# Patient Record
Sex: MALE | Race: WHITE | Employment: FULL TIME | URBAN - METROPOLITAN AREA
[De-identification: names, ages, dates, MRNs, and addresses within clinical notes are randomized per-mention and may not be internally consistent; named-entity substitution may affect disease eponyms.]

---

## 2017-02-07 ENCOUNTER — TELEPHONE (OUTPATIENT)
Dept: FAMILY MEDICINE | Facility: CLINIC | Age: 52
End: 2017-02-07

## 2017-02-07 DIAGNOSIS — I10 HYPERTENSION GOAL BP (BLOOD PRESSURE) < 140/90: Primary | ICD-10-CM

## 2017-02-07 RX ORDER — LISINOPRIL/HYDROCHLOROTHIAZIDE 10-12.5 MG
1 TABLET ORAL EVERY MORNING
Qty: 30 TABLET | Refills: 0 | Status: SHIPPED | OUTPATIENT
Start: 2017-02-07 | End: 2017-03-22

## 2017-02-07 NOTE — TELEPHONE ENCOUNTER
Pt needs this medication refilled. Please call his wife with any problems. She is at [phone].      lisinopril-hydrochlorothiazide (PRINZIDE,ZESTORETIC) 10-12.5 MG per tablet        Last Written Prescription Date: 11-12-16  Last Fill Quantity: 90, # refills: 3  Last Office Visit with [facility], [facility] or [facility] prescribing provider: 04-29-16       POTASSIUM   Date Value Ref Range Status   11/12/2015 4.1 3.4 - 5.3 mmol/L Final     CREATININE   Date Value Ref Range Status   11/12/2015 1.02 0.66 - 1.25 mg/dL Final     BP Readings from Last 3 Encounters:   04/29/16 123/83   11/30/15 131/85   11/12/15 132/80     [facility] [place]

## 2017-02-07 NOTE — TELEPHONE ENCOUNTER
Medication is being filled for 1 time refill only due to:  Patient needs labs bmp. Patient needs to be seen because it has been more than one year since last visit. Blossom PHOENIX RN

## 2017-03-22 ENCOUNTER — OFFICE VISIT (OUTPATIENT)
Dept: FAMILY MEDICINE | Facility: CLINIC | Age: 52
End: 2017-03-22
Payer: COMMERCIAL

## 2017-03-22 VITALS
DIASTOLIC BLOOD PRESSURE: 80 MMHG | SYSTOLIC BLOOD PRESSURE: 124 MMHG | HEIGHT: 72 IN | TEMPERATURE: 98.1 F | BODY MASS INDEX: 34.05 KG/M2 | WEIGHT: 251.4 LBS | HEART RATE: 78 BPM

## 2017-03-22 DIAGNOSIS — E78.5 HYPERLIPIDEMIA LDL GOAL <130: ICD-10-CM

## 2017-03-22 DIAGNOSIS — I10 HYPERTENSION GOAL BP (BLOOD PRESSURE) < 140/90: Primary | ICD-10-CM

## 2017-03-22 DIAGNOSIS — Z11.59 NEED FOR HEPATITIS C SCREENING TEST: ICD-10-CM

## 2017-03-22 LAB
ANION GAP SERPL CALCULATED.3IONS-SCNC: 3 MMOL/L (ref 3–14)
BUN SERPL-MCNC: 15 MG/DL (ref 7–30)
CALCIUM SERPL-MCNC: 8.5 MG/DL (ref 8.5–10.1)
CHLORIDE SERPL-SCNC: 104 MMOL/L (ref 94–109)
CHOLEST SERPL-MCNC: 201 MG/DL
CO2 SERPL-SCNC: 33 MMOL/L (ref 20–32)
CREAT SERPL-MCNC: 0.99 MG/DL (ref 0.66–1.25)
GFR SERPL CREATININE-BSD FRML MDRD: 79 ML/MIN/1.7M2
GLUCOSE SERPL-MCNC: 103 MG/DL (ref 70–99)
HDLC SERPL-MCNC: 37 MG/DL
LDLC SERPL CALC-MCNC: 145 MG/DL
NONHDLC SERPL-MCNC: 164 MG/DL
POTASSIUM SERPL-SCNC: 3.9 MMOL/L (ref 3.4–5.3)
SODIUM SERPL-SCNC: 140 MMOL/L (ref 133–144)
TRIGL SERPL-MCNC: 96 MG/DL

## 2017-03-22 PROCEDURE — 86803 HEPATITIS C AB TEST: CPT | Performed by: FAMILY MEDICINE

## 2017-03-22 PROCEDURE — 80061 LIPID PANEL: CPT | Performed by: FAMILY MEDICINE

## 2017-03-22 PROCEDURE — 99214 OFFICE O/P EST MOD 30 MIN: CPT | Performed by: FAMILY MEDICINE

## 2017-03-22 PROCEDURE — 80048 BASIC METABOLIC PNL TOTAL CA: CPT | Performed by: FAMILY MEDICINE

## 2017-03-22 PROCEDURE — 36415 COLL VENOUS BLD VENIPUNCTURE: CPT | Performed by: FAMILY MEDICINE

## 2017-03-22 RX ORDER — LISINOPRIL/HYDROCHLOROTHIAZIDE 10-12.5 MG
1 TABLET ORAL EVERY MORNING
Qty: 90 TABLET | Refills: 3 | Status: SHIPPED | OUTPATIENT
Start: 2017-03-22 | End: 2018-04-30

## 2017-03-22 ASSESSMENT — PAIN SCALES - GENERAL: PAINLEVEL: NO PAIN (0)

## 2017-03-22 NOTE — MR AVS SNAPSHOT
After Visit Summary   3/22/2017    Juma Spivey    MRN: [id number]           Patient Information     Date Of Birth          1965        Visit Information        Provider Department      3/22/2017 7:20 AM Adri Harry MD [facility]        Today's Diagnoses     Need for hepatitis C screening test    -  1    Hypertension goal BP (blood pressure) < 140/90        Hyperlipidemia LDL goal <130          Care Instructions          Thank you for choosing [facility] [place] [facility].  You may be receiving a survey in the mail from Sandor Underwood regarding your visit today.  Please take a few minutes to complete and return the survey to let us know how we are doing.      Our Clinic hours are:  Mondays    7:20 am - 7 pm  Tues -  Fri  7:20 am - 5 pm    Clinic Phone: [phone]    The clinic lab opens at 7:30 am Mon - Fri and appointments are required.    [facility]  Ph. [phone]  Monday-Thursday 8 am - 7pm  Tues/Wed/Fri 8 am - 5:30 pm               Follow-ups after your visit        Who to contact     If you have questions or need follow up information about today's clinic visit or your schedule please contact [facility] directly at [phone].  Normal or non-critical lab and imaging results will be communicated to you by MyChart, letter or phone within 4 business days after the clinic has received the results. If you do not hear from us within 7 days, please contact the clinic through [employer] or phone. If you have a critical or abnormal lab result, we will notify you by phone as soon as possible.  Submit refill requests through [employer] or call your pharmacy and they will forward the refill request to us. Please allow 3 business days for your refill to be completed.          Additional Information About Your Visit        [employer]-art ([employer]) Information     [employer] gives you secure access to your electronic health record. If you see a primary care  "provider, you can also send messages to your care team and make appointments. If you have questions, please call your primary care clinic.  If you do not have a primary care provider, please call [phone] and they will assist you.        Care EveryWhere ID     This is your Care EveryWhere ID. This could be used by other organizations to access your [place] medical records  [id number]        Your Vitals Were     Pulse Temperature Height BMI (Body Mass Index)          78 98.1  F (36.7  C) (Tympanic) 5' 11.75\" (1.822 m) 34.33 kg/m2         Blood Pressure from Last 3 Encounters:   03/22/17 124/80   04/29/16 123/83   11/30/15 131/85    Weight from Last 3 Encounters:   03/22/17 251 lb 6.4 oz (114 kg)   04/29/16 245 lb (111.1 kg)   11/30/15 253 lb (114.8 kg)              We Performed the Following     Basic metabolic panel     Hepatitis C antibody     Lipid Profile (Chol, Trig, HDL, LDL calc)          Today's Medication Changes          These changes are accurate as of: 3/22/17  8:28 AM.  If you have any questions, ask your nurse or doctor.               These medicines have changed or have updated prescriptions.        Dose/Directions    lisinopril-hydrochlorothiazide 10-12.5 MG per tablet   Commonly known as:  PRINZIDE/ZESTORETIC   This may have changed:  additional instructions   Used for:  Hypertension goal BP (blood pressure) < 140/90   Changed by:  Adri Harry MD        Dose:  1 tablet   Take 1 tablet by mouth every morning   Quantity:  90 tablet   Refills:  3            Where to get your medicines      These medications were sent to [facility]/pharmacy #[id number] - [place] [id number] [place] [address]  [address] [address], [facility] [address]     Phone:  [phone]     lisinopril-hydrochlorothiazide 10-12.5 MG per tablet                Primary Care Provider Office Phone # Fax #    Bryan Harry [phone]       [facility] [address] [facility] [address]   "      Thank you!     Thank you for choosing [facility]  for your care. Our goal is always to provide you with excellent care. Hearing back from our patients is one way we can continue to improve our services. Please take a few minutes to complete the written survey that you may receive in the mail after your visit with us. Thank you!             Your Updated Medication List - Protect others around you: Learn how to safely use, store and throw away your medicines at www.disposemymeds.org.          This list is accurate as of: 3/22/17  8:28 AM.  Always use your most recent med list.                   Brand Name Dispense Instructions for use    fluticasone 50 MCG/ACT spray    FLONASE    1 g    Spray 2 sprays into both nostrils daily Schedule appt for further refills.       lisinopril-hydrochlorothiazide 10-12.5 MG per tablet    PRINZIDE/ZESTORETIC    90 tablet    Take 1 tablet by mouth every morning       order for DME     1 pair    Compression stockings

## 2017-03-22 NOTE — PROGRESS NOTES
SUBJECTIVE:                                                    Juma Spivey is a 51 year old male who presents to clinic today for the following health issues:      Hypertension Follow-up      Outpatient blood pressures are being checked at home.  Results are 127/80.    Low Salt Diet: no added salt       Amount of exercise or physical activity: 2-3 days/week for an average of 30-45 minutes    Problems taking medications regularly: No    Medication side effects: none    Diet: low salt    ADDITIONAL HPI: 51 year old male here for above issue.  Also has a history of hyperlipidemia, not on medication. Will plan to re-check labs. Denies ay chest pain, shortness of breath, palpitations.    ROS: 10 point review of systems negative except as per HPI.    PAST MEDICAL HISTORY:  Past Medical History:   Diagnosis Date     Other lymphedema         ACTIVE MEDICAL PROBLEMS:  Patient Active Problem List   Diagnosis     Lymphedema     Hypertension goal BP (blood pressure) < 140/90     Hyperlipidemia LDL goal <130     Overweight     Microscopic hematuria     Colon polyp        FAMILY HISTORY:  Family History   Problem Relation Age of Onset     Hypertension Mother      Neurologic Disorder Mother      brain bleeds     Alcohol/Drug Father      Breast Cancer Sister        SOCIAL HISTORY:  Social History     Social History     Marital status:      Spouse name: N/A     Number of children: N/A     Years of education: N/A     Occupational History     Not on file.     Social History Main Topics     Smoking status: Never Smoker     Smokeless tobacco: Never Used     Alcohol use Yes      Comment: occ.     Drug use: No     Sexual activity: Yes     Partners: Female     Other Topics Concern     Parent/Sibling W/ Cabg, Mi Or Angioplasty Before 65f 55m? No     Social History Narrative       MEDICATIONS:  Current Outpatient Prescriptions   Medication Sig Dispense Refill     lisinopril-hydrochlorothiazide (PRINZIDE/ZESTORETIC) 10-12.5 MG  "per tablet Take 1 tablet by mouth every morning 90 tablet 3     [DISCONTINUED] lisinopril-hydrochlorothiazide (PRINZIDE/ZESTORETIC) 10-12.5 MG per tablet Take 1 tablet by mouth every morning Needs labs and office visit for further refills 30 tablet 0     fluticasone (FLONASE) 50 MCG/ACT nasal spray Spray 2 sprays into both nostrils daily Schedule appt for further refills. (Patient not taking: Reported on 3/22/2017) 1 g 1     ORDER FOR DME Compression stockings 1 pair 0       ALLERGIES:     Allergies   Allergen Reactions     Penicillins      Thinks he may be allergic because his father was       Problem list, Medication list, Allergies, and Medical/Social/Surgical histories reviewed in [employer] and updated as appropriate.    OBJECTIVE:                                                    VITALS: /80 (BP Location: Right arm, Patient Position: Chair, Cuff Size: Adult Large)  Pulse 78  Temp 98.1  F (36.7  C) (Tympanic)  Ht 5' 11.75\" (1.822 m)  Wt 251 lb 6.4 oz (114 kg)  BMI 34.33 kg/m2 Body mass index is 34.33 kg/(m^2).  GENERAL: Pleasant, well appearing male.  HEENT: PERRL, EOMI, oropharynx clear.  NECK: supple, no thyromegaly or thyroid masses, no lymphadenopathy.  CV: RRR, no murmurs, rubs or gallops. No carotid bruits.   LUNGS: Clear to auscultation bilaterally, normal effort.  ABDOMEN: Soft, non-distended, non-tender.  No hepatosplenomegaly or palpable masses.    SKIN: warm and dry without obvious rashes.   EXTREMITIES: No edema, normal pulses.     ASSESSMENT/PLAN:                                                    1. Hypertension goal BP (blood pressure) < 140/90  Stable. Refilled medication and checked labs.    - lisinopril-hydrochlorothiazide (PRINZIDE/ZESTORETIC) 10-12.5 MG per tablet; Take 1 tablet by mouth every morning  Dispense: 90 tablet; Refill: 3  - Basic metabolic panel    2. Hyperlipidemia LDL goal <130  Re-check labs.  - Lipid Profile (Chol, Trig, HDL, LDL calc)    3. Need for hepatitis C " screening test  - Hepatitis C antibody

## 2017-03-22 NOTE — PATIENT INSTRUCTIONS
Thank you for choosing [facility].  You may be receiving a survey in the mail from [facility] regarding your visit today.  Please take a few minutes to complete and return the survey to let us know how we are doing.      Our Clinic hours are:  Mondays    7:20 am - 7 pm  Tues -  Fri  7:20 am - 5 pm    Clinic Phone: [phone]    The clinic lab opens at 7:30 am Mon - Fri and appointments are required.    [facility]. [phone]  Monday-Thursday 8 am - 7pm  Tues/Wed/Fri 8 am - 5:30 pm

## 2017-03-22 NOTE — NURSING NOTE
"Chief Complaint   Patient presents with     Hypertension     med recheck and refill       Initial /80 (BP Location: Right arm, Patient Position: Chair, Cuff Size: Adult Large)  Pulse 78  Temp 98.1  F (36.7  C) (Tympanic)  Ht 5' 11.75\" (1.822 m)  Wt 251 lb 6.4 oz (114 kg)  BMI 34.33 kg/m2 Estimated body mass index is 34.33 kg/(m^2) as calculated from the following:    Height as of this encounter: 5' 11.75\" (1.822 m).    Weight as of this encounter: 251 lb 6.4 oz (114 kg).  Medication Reconciliation: complete    "

## 2017-03-23 LAB — HCV AB SERPL QL IA: NORMAL

## 2017-11-06 ENCOUNTER — MEDICAL CORRESPONDENCE (OUTPATIENT)
Dept: HEALTH INFORMATION MANAGEMENT | Facility: CLINIC | Age: 52
End: 2017-11-06

## 2017-12-28 ENCOUNTER — RADIANT APPOINTMENT (OUTPATIENT)
Dept: GENERAL RADIOLOGY | Facility: CLINIC | Age: 52
End: 2017-12-28
Attending: FAMILY MEDICINE
Payer: COMMERCIAL

## 2017-12-28 ENCOUNTER — OFFICE VISIT (OUTPATIENT)
Dept: FAMILY MEDICINE | Facility: CLINIC | Age: 52
End: 2017-12-28
Payer: COMMERCIAL

## 2017-12-28 VITALS
BODY MASS INDEX: 35.05 KG/M2 | SYSTOLIC BLOOD PRESSURE: 155 MMHG | DIASTOLIC BLOOD PRESSURE: 86 MMHG | WEIGHT: 258.8 LBS | TEMPERATURE: 97.9 F | HEIGHT: 72 IN | HEART RATE: 83 BPM

## 2017-12-28 DIAGNOSIS — M25.562 ACUTE PAIN OF LEFT KNEE: Primary | ICD-10-CM

## 2017-12-28 PROCEDURE — 73560 X-RAY EXAM OF KNEE 1 OR 2: CPT | Mod: LT

## 2017-12-28 PROCEDURE — 99214 OFFICE O/P EST MOD 30 MIN: CPT | Performed by: FAMILY MEDICINE

## 2017-12-28 RX ORDER — PREDNISONE 20 MG/1
40 TABLET ORAL DAILY
Qty: 10 TABLET | Refills: 0 | Status: SHIPPED | OUTPATIENT
Start: 2017-12-28 | End: 2018-01-02

## 2017-12-28 ASSESSMENT — ANXIETY QUESTIONNAIRES
1. FEELING NERVOUS, ANXIOUS, OR ON EDGE: SEVERAL DAYS
7. FEELING AFRAID AS IF SOMETHING AWFUL MIGHT HAPPEN: NOT AT ALL
6. BECOMING EASILY ANNOYED OR IRRITABLE: NOT AT ALL
1. FEELING NERVOUS, ANXIOUS, OR ON EDGE: NOT AT ALL
2. NOT BEING ABLE TO STOP OR CONTROL WORRYING: NOT AT ALL
IF YOU CHECKED OFF ANY PROBLEMS ON THIS QUESTIONNAIRE, HOW DIFFICULT HAVE THESE PROBLEMS MADE IT FOR YOU TO DO YOUR WORK, TAKE CARE OF THINGS AT HOME, OR GET ALONG WITH OTHER PEOPLE: NOT DIFFICULT AT ALL
GAD7 TOTAL SCORE: 2
3. WORRYING TOO MUCH ABOUT DIFFERENT THINGS: SEVERAL DAYS
7. FEELING AFRAID AS IF SOMETHING AWFUL MIGHT HAPPEN: NOT AT ALL
GAD7 TOTAL SCORE: 0
3. WORRYING TOO MUCH ABOUT DIFFERENT THINGS: NOT AT ALL
2. NOT BEING ABLE TO STOP OR CONTROL WORRYING: NOT AT ALL
6. BECOMING EASILY ANNOYED OR IRRITABLE: NOT AT ALL
5. BEING SO RESTLESS THAT IT IS HARD TO SIT STILL: NOT AT ALL
5. BEING SO RESTLESS THAT IT IS HARD TO SIT STILL: NOT AT ALL

## 2017-12-28 ASSESSMENT — PATIENT HEALTH QUESTIONNAIRE - PHQ9
5. POOR APPETITE OR OVEREATING: NOT AT ALL
5. POOR APPETITE OR OVEREATING: NOT AT ALL
SUM OF ALL RESPONSES TO PHQ QUESTIONS 1-9: 10

## 2017-12-28 NOTE — PROGRESS NOTES
SUBJECTIVE:   Juma Spivey is a 52 year old male who presents to clinic today for the following health issues:    Joint Pain    Onset: Around thanksgiving    Description:   Location: left knee - below kneecap and anterior  Character: Sharp, Dull ache, Stabbing and Burning    Intensity: moderate to severe, 4/10 at visit today    Progression of Symptoms: same    Accompanying Signs & Symptoms:  Other symptoms: radiation of pain up to thigh and down into calf    History:   Previous similar pain: YES- years ago he was told he had bursitis and at that time it was in both legs.      Precipitating factors:   Trauma or overuse: no     Alleviating factors:  Improved by: rest/inactivity, ice and Advil     Therapies Tried and outcome:  rest/inactivity, ice and Advil seemed to dull the pain.   Tried not to pivot on left knee as much as he can - not much relief.    Verified above history with patient.    Patient denies change in activity, new hobbies or trauma prior to onset.  Patient is a .    Problem list and histories reviewed & adjusted, as indicated.  Additional history: as documented    Patient Active Problem List   Diagnosis     Lymphedema     Hypertension goal BP (blood pressure) < 140/90     Hyperlipidemia LDL goal <130     Overweight     Microscopic hematuria     Colon polyp     History reviewed. No pertinent surgical history.    Social History   Substance Use Topics     Smoking status: Never Smoker     Smokeless tobacco: Never Used     Alcohol use Yes      Comment: occ.     Family History   Problem Relation Age of Onset     Hypertension Mother      Neurologic Disorder Mother      brain bleeds     Alcohol/Drug Father      Breast Cancer Sister          Current Outpatient Prescriptions   Medication Sig Dispense Refill     predniSONE (DELTASONE) 20 MG tablet Take 2 tablets (40 mg) by mouth daily for 5 days 10 tablet 0     lisinopril-hydrochlorothiazide (PRINZIDE/ZESTORETIC) 10-12.5 MG per tablet Take 1  "tablet by mouth every morning 90 tablet 3     ORDER FOR DME Compression stockings 1 pair 0     fluticasone (FLONASE) 50 MCG/ACT nasal spray Spray 2 sprays into both nostrils daily Schedule appt for further refills. (Patient not taking: Reported on 12/28/2017) 1 g 1     Allergies   Allergen Reactions     Penicillins      Thinks he may be allergic because his father was         Reviewed and updated as needed this visit by clinical staff  Tobacco  Allergies  Meds  Problems  Med Hx  Surg Hx  Fam Hx  Soc Hx        Reviewed and updated as needed this visit by Provider  Allergies  Meds  Problems         ROS:  C: NEGATIVE for fever, chills, change in weight  I: NEGATIVE for worrisome rashes, moles or lesions  MUSCULOSKELETAL:see above  N: NEGATIVE for weakness, dizziness or paresthesias  H: NEGATIVE for bleeding problems    OBJECTIVE:                                                    /86 (BP Location: Right arm, Patient Position: Sitting, Cuff Size: Adult Large)  Pulse 83  Temp 97.9  F (36.6  C) (Tympanic)  Ht 5' 11.75\" (1.822 m)  Wt 258 lb 12.8 oz (117.4 kg)  BMI 35.34 kg/m2  Body mass index is 35.34 kg/(m^2).  GENERAL: obese, alert and no distress  Knee Exam, LEFT:  Inspection: AP/lateral alignment normal, No effusion, No quad atrophy  Tender: medial joint line, medial tibial plateau  Non-tender: rest of knee  Active Range of Motion: full flexion, pain with flexion, full extension, no pain with extension, no patellofemoral crepitus with extension  Strength: 5/5 all groups  Special tests: positive Valgus stress test, normal Varus, negative Lachman's test, negative pivot shift, positive hyperflexion external rotation test, no apprehension with lateral stress of the patella    Also examined: hip full range of motion     Diagnostic test results:  Diagnostic Test Results:  none        ASSESSMENT/PLAN:                                                      ICD-10-CM    1. Acute pain of left knee M25.562 " predniSONE (DELTASONE) 20 MG tablet     ORTHO  REFERRAL     XR Knee Standing AP Bilat & Lateral Left     No acute joint today.  No significant pain or LROM that would warrant immediate MRI eval, but should r/o DJD.  Possible strain or partial meniscus or ligament tear.   Discussed trial of prednisone oral. Patient concurred.    Acetaminophen 325 mg orally 1-2 tabs every 4-6 hrs as needed for pain    Ice compress Q6-8 hrs   Avoid repetitive motion.  Advised to see provider if with severe pain, LROM, fever or joint redness.     Ortho consult discussed and patient concurred.  Advised possible physical therapy may be next step if not completely resolved.    Follow up with Provider - at ortho appointment   Patient Instructions   Xray result to be called to you in 24 hours.  You will be contacted in 1-2 business days to get a schedule for the non-surgical orthopedic specialist.    Take prednisone as prescribed for next 5 days.        Knee Pain of Uncertain Cause    There are several common causes for knee pain. These can include:    A sprain of the ligaments that support the joint    An injury to the cartilage lining of the joint    Arthritis from wear-and-tear or inflammation  There are other causes as well. There may also be swelling, reduced movement of the knee joint, and pain with walking. A definite diagnosis will still need to be made. If your symptoms do not improve, further follow-up and testing may be needed.  Home care    Stay off the injured leg as much as possible until pain improves.    Apply an ice pack over the injured area for 15 to 20 minutes every 3 to 6 hours. You should do this for the first 24 to 48 hours. You can make an ice pack by filling a plastic bag that seals at the top with ice cubes and then wrapping it with a thin towel. Continue to use ice packs for relief of pain and swelling as needed. As the ice melts, be careful to avoid getting your wrap, splint, or cast wet. After 48 hours,  apply heat (warm shower or warm bath) for 15 to 20 minutes several times a day, or alternate ice and heat. If you have to wear a hook-and-loop knee brace, you can open it to apply the ice pack, or heat, directly to the knee. Never put ice directly on the skin. Always wrap the ice in a towel or other type of cloth.    You may use over-the-counter pain medicine to control pain, unless another pain medicine was prescribed. If you have chronic liver or kidney disease or ever had a stomach ulcer or GI bleeding, talk with your healthcare provider using these medicines.    If crutches or a walker have been recommended, do not put weight on the injured leg until you can do so without pain. Check with your healthcare provider before returning to sports or full work duties.    If you have a hook-and-loop knee brace, you can remove it to bathe and sleep, unless told otherwise.  Follow-up care  Follow up with your healthcare provider as advised. This is usually within 1-2 weeks.  If X-rays were taken, you will be told of any new findings that may affect your care.  Call 911  Call 911 if you have:    Shortness of breath    Chest pain  When to seek medical advice  Call your healthcare provider right away if any of these occur:    Toes or foot becomes swollen, cold, blue, numb, or tingly    Pain or swelling spreads over the knee or calf    Warmth or redness appears over the knee or calf    Other joints become painful    Rash appears    Fever of 100.4 F (38 C) or above lasting for 24 to 48 hours  Date Last Reviewed: 11/23/2015 2000-2017 [employer]. [address] [place] [address], [place] [address]. All rights reserved. This information is not intended as a substitute for professional medical care. Always follow your healthcare professional's instructions.            Martín Beatty MD  [facility]

## 2017-12-28 NOTE — NURSING NOTE
"Chief Complaint   Patient presents with     Knee Pain     Since thanksgiving       Initial /86 (BP Location: Right arm, Patient Position: Sitting, Cuff Size: Adult Large)  Pulse 83  Temp 97.9  F (36.6  C) (Tympanic)  Ht 5' 11.75\" (1.822 m)  Wt 258 lb 12.8 oz (117.4 kg)  BMI 35.34 kg/m2 Estimated body mass index is 35.34 kg/(m^2) as calculated from the following:    Height as of this encounter: 5' 11.75\" (1.822 m).    Weight as of this encounter: 258 lb 12.8 oz (117.4 kg).  Medication Reconciliation: complete   Janee Short CMA  "

## 2017-12-28 NOTE — PATIENT INSTRUCTIONS
Xray result to be called to you in 24 hours.  You will be contacted in 1-2 business days to get a schedule for the non-surgical orthopedic specialist.    Take prednisone as prescribed for next 5 days.        Knee Pain of Uncertain Cause    There are several common causes for knee pain. These can include:    A sprain of the ligaments that support the joint    An injury to the cartilage lining of the joint    Arthritis from wear-and-tear or inflammation  There are other causes as well. There may also be swelling, reduced movement of the knee joint, and pain with walking. A definite diagnosis will still need to be made. If your symptoms do not improve, further follow-up and testing may be needed.  Home care    Stay off the injured leg as much as possible until pain improves.    Apply an ice pack over the injured area for 15 to 20 minutes every 3 to 6 hours. You should do this for the first 24 to 48 hours. You can make an ice pack by filling a plastic bag that seals at the top with ice cubes and then wrapping it with a thin towel. Continue to use ice packs for relief of pain and swelling as needed. As the ice melts, be careful to avoid getting your wrap, splint, or cast wet. After 48 hours, apply heat (warm shower or warm bath) for 15 to 20 minutes several times a day, or alternate ice and heat. If you have to wear a hook-and-loop knee brace, you can open it to apply the ice pack, or heat, directly to the knee. Never put ice directly on the skin. Always wrap the ice in a towel or other type of cloth.    You may use over-the-counter pain medicine to control pain, unless another pain medicine was prescribed. If you have chronic liver or kidney disease or ever had a stomach ulcer or GI bleeding, talk with your healthcare provider using these medicines.    If crutches or a walker have been recommended, do not put weight on the injured leg until you can do so without pain. Check with your healthcare provider before returning  to sports or full work duties.    If you have a hook-and-loop knee brace, you can remove it to bathe and sleep, unless told otherwise.  Follow-up care  Follow up with your healthcare provider as advised. This is usually within 1-2 weeks.  If X-rays were taken, you will be told of any new findings that may affect your care.  Call 911  Call 911 if you have:    Shortness of breath    Chest pain  When to seek medical advice  Call your healthcare provider right away if any of these occur:    Toes or foot becomes swollen, cold, blue, numb, or tingly    Pain or swelling spreads over the knee or calf    Warmth or redness appears over the knee or calf    Other joints become painful    Rash appears    Fever of 100.4 F (38 C) or above lasting for 24 to 48 hours  Date Last Reviewed: 11/23/2015 2000-2017 [employer]. [address] [place] [address]. All rights reserved. This information is not intended as a substitute for professional medical care. Always follow your healthcare professional's instructions.

## 2017-12-28 NOTE — MR AVS SNAPSHOT
After Visit Summary   12/28/2017    Juma Spivey    MRN: [id number]           Patient Information     Date Of Birth          1965        Visit Information        Provider Department      12/28/2017 1:20 PM Martín Beatty MD [facility]        Today's Diagnoses     Acute pain of left knee    -  1      Care Instructions    Xray result to be called to you in 24 hours.  You will be contacted in 1-2 business days to get a schedule for the non-surgical orthopedic specialist.    Take prednisone as prescribed for next 5 days.        Knee Pain of Uncertain Cause    There are several common causes for knee pain. These can include:    A sprain of the ligaments that support the joint    An injury to the cartilage lining of the joint    Arthritis from wear-and-tear or inflammation  There are other causes as well. There may also be swelling, reduced movement of the knee joint, and pain with walking. A definite diagnosis will still need to be made. If your symptoms do not improve, further follow-up and testing may be needed.  Home care    Stay off the injured leg as much as possible until pain improves.    Apply an ice pack over the injured area for 15 to 20 minutes every 3 to 6 hours. You should do this for the first 24 to 48 hours. You can make an ice pack by filling a plastic bag that seals at the top with ice cubes and then wrapping it with a thin towel. Continue to use ice packs for relief of pain and swelling as needed. As the ice melts, be careful to avoid getting your wrap, splint, or cast wet. After 48 hours, apply heat (warm shower or warm bath) for 15 to 20 minutes several times a day, or alternate ice and heat. If you have to wear a hook-and-loop knee brace, you can open it to apply the ice pack, or heat, directly to the knee. Never put ice directly on the skin. Always wrap the ice in a towel or other type of cloth.    You may use over-the-counter pain medicine to  control pain, unless another pain medicine was prescribed. If you have chronic liver or kidney disease or ever had a stomach ulcer or GI bleeding, talk with your healthcare provider using these medicines.    If crutches or a walker have been recommended, do not put weight on the injured leg until you can do so without pain. Check with your healthcare provider before returning to sports or full work duties.    If you have a hook-and-loop knee brace, you can remove it to bathe and sleep, unless told otherwise.  Follow-up care  Follow up with your healthcare provider as advised. This is usually within 1-2 weeks.  If X-rays were taken, you will be told of any new findings that may affect your care.  Call 911  Call 911 if you have:    Shortness of breath    Chest pain  When to seek medical advice  Call your healthcare provider right away if any of these occur:    Toes or foot becomes swollen, cold, blue, numb, or tingly    Pain or swelling spreads over the knee or calf    Warmth or redness appears over the knee or calf    Other joints become painful    Rash appears    Fever of 100.4 F (38 C) or above lasting for 24 to 48 hours  Date Last Reviewed: 11/23/2015 2000-2017 [employer]. [address] [place] [address]. All rights reserved. This information is not intended as a substitute for professional medical care. Always follow your healthcare professional's instructions.                Follow-ups after your visit        Additional Services     ORTHO  REFERRAL       [facility] is referring you to the Orthopedic  Services at [facility].       The  Representative will assist you in the coordination of your Orthopedic and Musculoskeletal Care as prescribed by your physician.    The  Representative will call you within 1 business day to help schedule your appointment, or you may contact the  Representative at:    All  areas ~ [phone]     Type of Referral : Non Surgical       Timeframe requested: 1 - 2 days    Coverage of these services is subject to the terms and limitations of your health insurance plan.  Please call member services at your health plan with any benefit or coverage questions.      If X-rays, CT or MRI's have been performed, please contact the facility where they were done to arrange for , prior to your scheduled appointment.  Please bring this referral request to your appointment and present it to your specialist.                  Follow-up notes from your care team     Return if symptoms worsen or fail to improve.      Who to contact     If you have questions or need follow up information about today's clinic visit or your schedule please contact [facility] directly at [phone].  Normal or non-critical lab and imaging results will be communicated to you by MyChart, letter or phone within 4 business days after the clinic has received the results. If you do not hear from us within 7 days, please contact the clinic through [email or web address] or phone. If you have a critical or abnormal lab result, we will notify you by phone as soon as possible.  Submit refill requests through [employer] or call your pharmacy and they will forward the refill request to us. Please allow 3 business days for your refill to be completed.          Additional Information About Your Visit        [employer] Information     [employer] gives you secure access to your electronic health record. If you see a primary care provider, you can also send messages to your care team and make appointments. If you have questions, please call your primary care clinic.  If you do not have a primary care provider, please call [phone] and they will assist you.        Care EveryWhere ID     This is your Care EveryWhere ID. This could be used by other organizations to access your [place] medical records  [id number]        Your Vitals Were   "   Pulse Temperature Height BMI (Body Mass Index)          83 97.9  F (36.6  C) (Tympanic) 5' 11.75\" (1.822 m) 35.34 kg/m2         Blood Pressure from Last 3 Encounters:   12/28/17 155/86   03/22/17 124/80   04/29/16 123/83    Weight from Last 3 Encounters:   12/28/17 258 lb 12.8 oz (117.4 kg)   03/22/17 251 lb 6.4 oz (114 kg)   04/29/16 245 lb (111.1 kg)              We Performed the Following     ORTHO  REFERRAL     XR Knee Standing AP Bilat & Lateral Left          Today's Medication Changes          These changes are accurate as of: 12/28/17  1:57 PM.  If you have any questions, ask your nurse or doctor.               Start taking these medicines.        Dose/Directions    predniSONE 20 MG tablet   Commonly known as:  DELTASONE   Used for:  Acute pain of left knee   Started by:  Martín Beatty MD        Dose:  40 mg   Take 2 tablets (40 mg) by mouth daily for 5 days   Quantity:  10 tablet   Refills:  0            Where to get your medicines      These medications were sent to [place] [address] [place] [address] [address]     Phone:  [phone]     predniSONE 20 MG tablet                Primary Care Provider Office Phone # Fax #    Bryan Fabian Harry [phone] [address] [facility] [address]        Equal Access to Services     [facility] ADY AH: Hadii lonnie hatch hadasho Soomaali, waaxda luqadaha, qaybta kaalmada adeegyada, aaron mejía ah. So [facility] [phone].    ATENCIÓN: Si habla español, tiene a anaya disposición servicios gratuitos de asistencia lingüística. Llame al [phone].    We comply with applicable federal civil rights laws and [place] laws. We do not discriminate on the basis of race, color, national origin, age, disability, sex, sexual orientation, or gender identity.            Thank you!     Thank you for choosing [facility]  for your care. Our goal " is always to provide you with excellent care. Hearing back from our patients is one way we can continue to improve our services. Please take a few minutes to complete the written survey that you may receive in the mail after your visit with us. Thank you!             Your Updated Medication List - Protect others around you: Learn how to safely use, store and throw away your medicines at www.disposemymeds.org.          This list is accurate as of: 12/28/17  1:57 PM.  Always use your most recent med list.                   Brand Name Dispense Instructions for use Diagnosis    fluticasone 50 MCG/ACT spray    FLONASE    1 g    Spray 2 sprays into both nostrils daily Schedule appt for further refills.    ETD (Eustachian tube dysfunction), left       lisinopril-hydrochlorothiazide 10-12.5 MG per tablet    PRINZIDE/ZESTORETIC    90 tablet    Take 1 tablet by mouth every morning    Hypertension goal BP (blood pressure) < 140/90       order for DME     1 pair    Compression stockings    Other lymphedema       predniSONE 20 MG tablet    DELTASONE    10 tablet    Take 2 tablets (40 mg) by mouth daily for 5 days    Acute pain of left knee

## 2017-12-29 ASSESSMENT — ANXIETY QUESTIONNAIRES: GAD7 TOTAL SCORE: 0

## 2018-01-02 ENCOUNTER — OFFICE VISIT (OUTPATIENT)
Dept: ORTHOPEDICS | Facility: CLINIC | Age: 53
End: 2018-01-02
Payer: COMMERCIAL

## 2018-01-02 VITALS — WEIGHT: 258 LBS | HEIGHT: 72 IN | BODY MASS INDEX: 34.95 KG/M2

## 2018-01-02 DIAGNOSIS — M17.12 PRIMARY OSTEOARTHRITIS OF LEFT KNEE: ICD-10-CM

## 2018-01-02 DIAGNOSIS — M25.562 ACUTE PAIN OF LEFT KNEE: Primary | ICD-10-CM

## 2018-01-02 PROCEDURE — 99203 OFFICE O/P NEW LOW 30 MIN: CPT | Performed by: FAMILY MEDICINE

## 2018-01-02 NOTE — MR AVS SNAPSHOT
"              After Visit Summary   1/2/2018    Juma Spivey    MRN: [id number]           Patient Information     Date Of Birth          1965        Visit Information        Provider Department      1/2/2018 2:40 PM El Koehler DO [facility]        Today's Diagnoses     Acute pain of left knee    -  1    Primary osteoarthritis of left knee           Follow-ups after your visit        Who to contact     If you have questions or need follow up information about today's clinic visit or your schedule please contact [facility] directly at [phone].  Normal or non-critical lab and imaging results will be communicated to you by [employer], letter or phone within 4 business days after the clinic has received the results. If you do not hear from us within 7 days, please contact the clinic through [employer] or phone. If you have a critical or abnormal lab result, we will notify you by phone as soon as possible.  Submit refill requests through [employer] or call your pharmacy and they will forward the refill request to us. Please allow 3 business days for your refill to be completed.          Additional Information About Your Visit        MyChart Information     [employer] gives you secure access to your electronic health record. If you see a primary care provider, you can also send messages to your care team and make appointments. If you have questions, please call your primary care clinic.  If you do not have a primary care provider, please call [phone] and they will assist you.        Care EveryWhere ID     This is your Care EveryWhere ID. This could be used by other organizations to access your [place] medical records  [id number]        Your Vitals Were     Height BMI (Body Mass Index)                5' 11.75\" (1.822 m) 35.24 kg/m2           Blood Pressure from Last 3 Encounters:   01/18/18 120/80   01/02/18 (P) 139/86   12/28/17 " 155/86    Weight from Last 3 Encounters:   01/18/18 250 lb (113.4 kg)   01/02/18 258 lb (117 kg)   12/28/17 258 lb 12.8 oz (117.4 kg)              Today, you had the following     No orders found for display       Primary Care Provider Office Phone # Fax #    Bryan Fabian Harry [phone] [address] [facility] [address]        Equal Access to Services     ARSENIO GARSIA : Hadii aad ku hadasho Soomaali, waaxda luqadaha, qaybta kaalmada adeegyada, waxay idiin hayaan adeeg kharash la'aan . So [facility] [phone].    ATENCIÓN: Si habla español, tiene a anaya disposición servicios gratuitos de asistencia lingüística. [facility] [phone].    We comply with applicable federal civil rights laws and [place] laws. We do not discriminate on the basis of race, color, national origin, age, disability, sex, sexual orientation, or gender identity.            Thank you!     Thank you for choosing [facility]  for your care. Our goal is always to provide you with excellent care. Hearing back from our patients is one way we can continue to improve our services. Please take a few minutes to complete the written survey that you may receive in the mail after your visit with us. Thank you!             Your Updated Medication List - Protect others around you: Learn how to safely use, store and throw away your medicines at www.disposemymeds.org.          This list is accurate as of 1/2/18 11:59 PM.  Always use your most recent med list.                   Brand Name Dispense Instructions for use Diagnosis    fluticasone 50 MCG/ACT spray    FLONASE    1 g    Spray 2 sprays into both nostrils daily Schedule appt for further refills.    ETD (Eustachian tube dysfunction), left       lisinopril-hydrochlorothiazide 10-12.5 MG per tablet    PRINZIDE/ZESTORETIC    90 tablet    Take 1 tablet by mouth every morning    Hypertension goal BP (blood pressure) < 140/90       order for DME      1 pair    Compression stockings    Other lymphedema

## 2018-01-02 NOTE — LETTER
"    2018         RE: Juma Spivey  [address] [facility] [address]        Dear Colleague,    Thank you for referring your patient, Juma Spivey, to the [facility]. Please see a copy of my visit note below.    Juma Spivey  :  1965  DOS: 2018  MRN: [id number]    Sports Medicine Clinic Visit    PCP: Bryan Harry    Juma Spivey is a 52 year old male who is seen in consultation at the request of  Martín Beatty M.D. presenting with acute left knee pain.    Injury: Gradual onset of left knee pain over the last ~ 4 weeks.  Pain located over left deep medial knee, nonradiating.  Additional Features:  Positive: swelling and grinding.  Symptoms are better with Other medications: Prednisone and Rest.  Symptoms are worse with: walking, going from sit to stand, kneeling, getting into truck.  Other evaluation and/or treatments so far consists of: Ibuprofen, Other medications: Prednisone, Rest and PCP consult.  Recent imaging completed: X-rays completed 17.  Prior History of related problems: none    Social History: works as semi-    Review of Systems  Musculoskeletal: as above  Remainder of review of systems is negative including constitutional, CV, pulmonary, GI, Skin and Neurologic except as noted in HPI or medical history.    Past Medical History:   Diagnosis Date     Other lymphedema      No past surgical history on file.    Objective  BP (P) 139/86  Ht 5' 11.75\" (1.822 m)  Wt 258 lb (117 kg)  BMI 35.24 kg/m2    General: healthy, alert and in no distress    HEENT: no scleral icterus or conjunctival erythema   Skin: no suspicious lesions or rash. No jaundice.   CV: regular rhythm by palpation, 2+ distal pulses, no pedal edema    Resp: normal respiratory effort without conversational dyspnea   Psych: normal mood and affect    Gait: mildly antalgic, appropriate coordination and balance   Neuro: normal " light touch sensory exam of the extremities. Motor strength as noted below     LeftKnee exam    ROM:        Flexion 140 degrees       Extension 0 degrees       Range of motion limited by mild pain in terminal flexion    Inspection:       no visible ecchymosis        effusion noted trace    Skin:       no visible deformities       well perfused       capillary refill brisk    Patellar Motion:        Lateral tilt noted in patella       Crepitus noted in the patellofemoral joint    Tender:        lateral patellar border       medial joint line    Non Tender:         remainder of knee area        medial patellar border        lateral joint line        along MCL        distal IT Band        infrapatellar tendon        tibial tubercle       pes anserine bursa    Special Tests:        Mildly painful Edomnd       neg (-) Lachmans       neg (-) anterior drawer       neg (-) posterior drawer       + PF grind       neg (-) varus at 0 deg and 30 deg       neg (-) valgus at 0 deg and 30 deg    Evaluation of ipsilateral kinetic chain       normal strength with hip extension and abduction, but somewhat deconditioned b/l       Decreased strength with quad activation, L>R        Radiology  Results for orders placed or performed in visit on 12/28/17   XR Knee Standing AP Bilat & Lateral Left    Narrative    KNEE STANDING AP BILATERAL AND LATERAL LEFT   12/28/2017 2:12 PM     HISTORY:  Acute pain of left knee.    COMPARISON: 1/15/2009.      Impression    IMPRESSION:   1. Right knee AP view: Normal and unchanged.  2. Left knee 3 views: Mild narrowing of the medial knee joint  compartment is new. Mild osteoarthritis of the patellofemoral joint  medially. No definite effusion.    ANKIT CHEN MD         Assessment:  1. Acute pain of left knee    2. Primary osteoarthritis of left knee        Plan:  Discussed the assessment with the patient.  Follow up: prn  Reviewed low impact activity options and need for strengthening  exercises  Offered PT and reviewed HEP  Signs of PFS and medial joint line pain consistent with XR showing mild OA changes  Reviewed wt loss, activity modification and progressive increase in activity as tolerated and guided by pain  Reviewed options for potential steroid vs viscosupplementation injections and the possibility for future orthopedic referral prn  Reviewed safe and appropriate OTC medication choices, try tylenol first  Up to 3000mg daily of tylenol is generally safe, NSAID dosing and duration limitations reviewed  Discussed nature of degenerative arthrosis of the knee.   Discussed symptom treatment with ice or heat, topical treatments, and rest if needed.   We discussed modified progressive pain-free activity as tolerated  Home handouts provided and supportive care reviewed  All questions were answered today  Contact us with additional questions or concerns  Signs and sx of concern reviewed      El Koehler DO, CAQ  Primary Care Sports Medicine  [facility]               Disclaimer: This note consists of symbols derived from keyboarding, dictation and/or voice recognition software. As a result, there may be errors in the script that have gone undetected. Please consider this when interpreting information found in this chart.    Again, thank you for allowing me to participate in the care of your patient.        Sincerely,        El Koehler DO

## 2018-01-02 NOTE — PROGRESS NOTES
"Juma Spivey  :  1965  DOS: 2018  MRN: [id number]    Sports Medicine Clinic Visit    PCP: Bryan Harry    Juma Spivey is a 52 year old male who is seen in consultation at the request of  Martín Beatty M.D. presenting with acute left knee pain.    Injury: Gradual onset of left knee pain over the last ~ 4 weeks.  Pain located over left deep medial knee, nonradiating.  Additional Features:  Positive: swelling and grinding.  Symptoms are better with Other medications: Prednisone and Rest.  Symptoms are worse with: walking, going from sit to stand, kneeling, getting into truck.  Other evaluation and/or treatments so far consists of: Ibuprofen, Other medications: Prednisone, Rest and PCP consult.  Recent imaging completed: X-rays completed 17.  Prior History of related problems: none    Social History: works as semi-    Review of Systems  Musculoskeletal: as above  Remainder of review of systems is negative including constitutional, CV, pulmonary, GI, Skin and Neurologic except as noted in HPI or medical history.    Past Medical History:   Diagnosis Date     Other lymphedema      No past surgical history on file.    Objective  BP (P) 139/86  Ht 5' 11.75\" (1.822 m)  Wt 258 lb (117 kg)  BMI 35.24 kg/m2    General: healthy, alert and in no distress    HEENT: no scleral icterus or conjunctival erythema   Skin: no suspicious lesions or rash. No jaundice.   CV: regular rhythm by palpation, 2+ distal pulses, no pedal edema    Resp: normal respiratory effort without conversational dyspnea   Psych: normal mood and affect    Gait: mildly antalgic, appropriate coordination and balance   Neuro: normal light touch sensory exam of the extremities. Motor strength as noted below     LeftKnee exam    ROM:        Flexion 140 degrees       Extension 0 degrees       Range of motion limited by mild pain in terminal flexion    Inspection:       no visible ecchymosis        " effusion noted trace    Skin:       no visible deformities       well perfused       capillary refill brisk    Patellar Motion:        Lateral tilt noted in patella       Crepitus noted in the patellofemoral joint    Tender:        lateral patellar border       medial joint line    Non Tender:         remainder of knee area        medial patellar border        lateral joint line        along MCL        distal IT Band        infrapatellar tendon        tibial tubercle       pes anserine bursa    Special Tests:        Mildly painful Edmond       neg (-) Lachmans       neg (-) anterior drawer       neg (-) posterior drawer       + PF grind       neg (-) varus at 0 deg and 30 deg       neg (-) valgus at 0 deg and 30 deg    Evaluation of ipsilateral kinetic chain       normal strength with hip extension and abduction, but somewhat deconditioned b/l       Decreased strength with quad activation, L>R        Radiology  Results for orders placed or performed in visit on 12/28/17   XR Knee Standing AP Bilat & Lateral Left    Narrative    KNEE STANDING AP BILATERAL AND LATERAL LEFT   12/28/2017 2:12 PM     HISTORY:  Acute pain of left knee.    COMPARISON: 1/15/2009.      Impression    IMPRESSION:   1. Right knee AP view: Normal and unchanged.  2. Left knee 3 views: Mild narrowing of the medial knee joint  compartment is new. Mild osteoarthritis of the patellofemoral joint  medially. No definite effusion.    ANKIT CHEN MD         Assessment:  1. Acute pain of left knee    2. Primary osteoarthritis of left knee        Plan:  Discussed the assessment with the patient.  Follow up: prn  Reviewed low impact activity options and need for strengthening exercises  Offered PT and reviewed HEP  Signs of PFS and medial joint line pain consistent with XR showing mild OA changes  Reviewed wt loss, activity modification and progressive increase in activity as tolerated and guided by pain  Reviewed options for potential steroid vs  viscosupplementation injections and the possibility for future orthopedic referral prn  Reviewed safe and appropriate OTC medication choices, try tylenol first  Up to 3000mg daily of tylenol is generally safe, NSAID dosing and duration limitations reviewed  Discussed nature of degenerative arthrosis of the knee.   Discussed symptom treatment with ice or heat, topical treatments, and rest if needed.   We discussed modified progressive pain-free activity as tolerated  Home handouts provided and supportive care reviewed  All questions were answered today  Contact us with additional questions or concerns  Signs and sx of concern reviewed      El Koehler DO, ARNULFO  Primary Care Sports Medicine  [facility]               Disclaimer: This note consists of symbols derived from keyboarding, dictation and/or voice recognition software. As a result, there may be errors in the script that have gone undetected. Please consider this when interpreting information found in this chart.

## 2018-01-18 ENCOUNTER — TELEPHONE (OUTPATIENT)
Dept: FAMILY MEDICINE | Facility: CLINIC | Age: 53
End: 2018-01-18

## 2018-01-18 ENCOUNTER — OFFICE VISIT (OUTPATIENT)
Dept: FAMILY MEDICINE | Facility: CLINIC | Age: 53
End: 2018-01-18
Payer: COMMERCIAL

## 2018-01-18 VITALS
HEIGHT: 71 IN | OXYGEN SATURATION: 96 % | HEART RATE: 76 BPM | DIASTOLIC BLOOD PRESSURE: 80 MMHG | SYSTOLIC BLOOD PRESSURE: 120 MMHG | BODY MASS INDEX: 35 KG/M2 | TEMPERATURE: 97.6 F | WEIGHT: 250 LBS

## 2018-01-18 DIAGNOSIS — L03.114 CELLULITIS OF LEFT UPPER EXTREMITY: Primary | ICD-10-CM

## 2018-01-18 PROCEDURE — 99213 OFFICE O/P EST LOW 20 MIN: CPT

## 2018-01-18 RX ORDER — CEPHALEXIN 500 MG/1
500 CAPSULE ORAL 2 TIMES DAILY
Qty: 20 CAPSULE | Refills: 0 | Status: SHIPPED | OUTPATIENT
Start: 2018-01-18 | End: 2019-09-20

## 2018-01-18 NOTE — PROGRESS NOTES
"  SUBJECTIVE:   Juma Spivey is a 52 year old male who presents to clinic today for the following health issues:    Infection       Duration: started this morning     Description (location/character/radiation): Infection- has a cut on left middle finger that occurred 1.5 weeks ago     Intensity:  moderate    Accompanying signs and symptoms: redness in hand, swelling, painful to the touch, hot to the touch, redness goes from finger to wrist     History (similar episodes/previous evaluation): patient has lymphedema     Precipitating or alleviating factors: None    Therapies tried and outcome: None        Problem list and histories reviewed & adjusted, as indicated.  Additional history: as documented    Patient Active Problem List   Diagnosis     Lymphedema     Hypertension goal BP (blood pressure) < 140/90     Hyperlipidemia LDL goal <130     Overweight     Microscopic hematuria     Colon polyp     History reviewed. No pertinent surgical history.    Social History   Substance Use Topics     Smoking status: Never Smoker     Smokeless tobacco: Never Used     Alcohol use Yes      Comment: occ.     Family History   Problem Relation Age of Onset     Hypertension Mother      Neurologic Disorder Mother      brain bleeds     Alcohol/Drug Father      Breast Cancer Sister              Reviewed and updated as needed this visit by clinical staffTobacco  Allergies  Meds  Med Hx  Surg Hx  Fam Hx  Soc Hx      Reviewed and updated as needed this visit by Provider         ROS:  Constitutional, HEENT, cardiovascular, pulmonary, gi and gu systems are negative, except as otherwise noted.      OBJECTIVE:   /80  Pulse 76  Temp 97.6  F (36.4  C) (Tympanic)  Ht 5' 11\" (1.803 m)  Wt 250 lb (113.4 kg)  SpO2 96%  BMI 34.87 kg/m2  Body mass index is 34.87 kg/(m^2).  GENERAL:  Alert and no distress  RESP: lungs clear to auscultation - no rales, rhonchi or wheezes  CV: regular rate and rhythm, normal S1 S2, no S3 or S4, no " murmur, click or rub, no peripheral edema and peripheral pulses strong  SKIN: erythematous swollen tender warm top of hand      ASSESSMENT/PLAN:     1. Cellulitis of left upper extremity    - cephALEXin (KEFLEX) 500 MG capsule; Take 1 capsule (500 mg) by mouth 2 times daily  Dispense: 20 capsule; Refill: 0    See Patient Instructions    FLNB SAME DAY PROVIDER  [facility]

## 2018-01-18 NOTE — PATIENT INSTRUCTIONS
Discharge Instructions for Cellulitis  You have been diagnosed with cellulitis. This is an infection in the deepest layer of the skin. In some cases, the infection also affects the muscle. Cellulitis is caused by bacteria. The bacteria can enter the body through broken skin. This can happen with a cut, scratch, animal bite, or an insect bite that has been scratched. You may have been treated in the hospital with antibiotics and fluids. You will likely be given a prescription for antibiotics to take at home. This sheet will help you take care of yourself at home.  Home care  When you are home:    Take the prescribed antibiotic medicine you are given as directed until it is gone. Take it even if you feel better. It treats the infection and stops it from returning. Not taking all the medicine can make future infections hard to treat.    Keep the infected area clean.    When possible, raise the infected area above the level of your heart. This helps keep swelling down.    Talk with your healthcare provider if you are in pain. Ask what kind of over-the-counter medicine you can take for pain.    Apply clean bandages as advised.    Take your temperature once a day for a week.    Wash your hands often to prevent spreading the infection.  In the future, wash your hands before and after you touch cuts, scratches, or bandages. This will help prevent infection.   When to call your healthcare provider  Call your healthcare provider immediately if you have any of the following:    Difficulty or pain when moving the joints above or below the infected area    Discharge or pus draining from the area    Fever of 100.4 F (38 C) or higher, or as directed by your healthcare provider    Pain that gets worse in or around the infected     Redness that gets worse in or around the infected area, particularly if the area of redness expands to a wider area    Shaking chills    Swelling of the infected area    Vomiting   Date Last Reviewed:  8/1/2016 2000-2017 [employer]. [address] [place] [address], [place] [address]. All rights reserved. This information is not intended as a substitute for professional medical care. Always follow your healthcare professional's instructions.

## 2018-01-18 NOTE — MR AVS SNAPSHOT
After Visit Summary   1/18/2018    Juma Spivey    MRN: [id number]           Patient Information     Date Of Birth          1965        Visit Information        Provider Department      1/18/2018 4:20 PM FLNB SAME DAY PROVIDER [facility]        Today's Diagnoses     Cellulitis of left upper extremity    -  1      Care Instructions      Discharge Instructions for Cellulitis  You have been diagnosed with cellulitis. This is an infection in the deepest layer of the skin. In some cases, the infection also affects the muscle. Cellulitis is caused by bacteria. The bacteria can enter the body through broken skin. This can happen with a cut, scratch, animal bite, or an insect bite that has been scratched. You may have been treated in the hospital with antibiotics and fluids. You will likely be given a prescription for antibiotics to take at home. This sheet will help you take care of yourself at home.  Home care  When you are home:    Take the prescribed antibiotic medicine you are given as directed until it is gone. Take it even if you feel better. It treats the infection and stops it from returning. Not taking all the medicine can make future infections hard to treat.    Keep the infected area clean.    When possible, raise the infected area above the level of your heart. This helps keep swelling down.    Talk with your healthcare provider if you are in pain. Ask what kind of over-the-counter medicine you can take for pain.    Apply clean bandages as advised.    Take your temperature once a day for a week.    Wash your hands often to prevent spreading the infection.  In the future, wash your hands before and after you touch cuts, scratches, or bandages. This will help prevent infection.   When to call your healthcare provider  Call your healthcare provider immediately if you have any of the following:    Difficulty or pain when moving the joints above or below the infected  area    Discharge or pus draining from the area    Fever of 100.4 F (38 C) or higher, or as directed by your healthcare provider    Pain that gets worse in or around the infected     Redness that gets worse in or around the infected area, particularly if the area of redness expands to a wider area    Shaking chills    Swelling of the infected area    Vomiting   Date Last Reviewed: 8/1/2016 2000-2017 [employer]. [address] [place] [address], [place] [address]. All rights reserved. This information is not intended as a substitute for professional medical care. Always follow your healthcare professional's instructions.                Follow-ups after your visit        Who to contact     If you have questions or need follow up information about today's clinic visit or your schedule please contact [facility] directly at [phone].  Normal or non-critical lab and imaging results will be communicated to you by MyChart, letter or phone within 4 business days after the clinic has received the results. If you do not hear from us within 7 days, please contact the clinic through [employer] or phone. If you have a critical or abnormal lab result, we will notify you by phone as soon as possible.  Submit refill requests through [employer] or call your pharmacy and they will forward the refill request to us. Please allow 3 business days for your refill to be completed.          Additional Information About Your Visit        [employer] Information     [employer] gives you secure access to your electronic health record. If you see a primary care provider, you can also send messages to your care team and make appointments. If you have questions, please call your primary care clinic.  If you do not have a primary care provider, please call [phone] and they will assist you.        Care EveryWhere ID     This is your Care EveryWhere ID. This could be used by other organizations to access your [facility]  "records  [id number]        Your Vitals Were     Pulse Temperature Height Pulse Oximetry BMI (Body Mass Index)       76 97.6  F (36.4  C) (Tympanic) 5' 11\" (1.803 m) 96% 34.87 kg/m2        Blood Pressure from Last 3 Encounters:   01/18/18 120/80   01/02/18 (P) 139/86   12/28/17 155/86    Weight from Last 3 Encounters:   01/18/18 250 lb (113.4 kg)   01/02/18 258 lb (117 kg)   12/28/17 258 lb 12.8 oz (117.4 kg)              Today, you had the following     No orders found for display       Primary Care Provider Office Phone # Fax #    Bryan Harry [phone] [address] [facility] [address]        Equal Access to Services     [facility]: Hadii lonnie hatch hadasho Soomaali, waaxda luqadaha, qaybta kaalmada adeegyada, aaron mejía . So [facility] [phone].    ATENCIÓN: Si habla español, tiene a anaya disposición servicios gratuitos de asistencia lingüística. Llame al [phone].    We comply with applicable federal civil rights laws and [place] laws. We do not discriminate on the basis of race, color, national origin, age, disability, sex, sexual orientation, or gender identity.            Thank you!     Thank you for choosing [facility]  for your care. Our goal is always to provide you with excellent care. Hearing back from our patients is one way we can continue to improve our services. Please take a few minutes to complete the written survey that you may receive in the mail after your visit with us. Thank you!             Your Updated Medication List - Protect others around you: Learn how to safely use, store and throw away your medicines at www.disposemymeds.org.          This list is accurate as of: 1/18/18  4:37 PM.  Always use your most recent med list.                   Brand Name Dispense Instructions for use Diagnosis    fluticasone 50 MCG/ACT spray    FLONASE    1 g    Spray 2 sprays into both nostrils daily Schedule appt " for further refills.    ETD (Eustachian tube dysfunction), left       lisinopril-hydrochlorothiazide 10-12.5 MG per tablet    PRINZIDE/ZESTORETIC    90 tablet    Take 1 tablet by mouth every morning    Hypertension goal BP (blood pressure) < 140/90       order for DME     1 pair    Compression stockings    Other lymphedema

## 2018-01-18 NOTE — TELEPHONE ENCOUNTER
"Reason for call:  Patient reporting a symptom    Symptom or request: Patient has lymphedema and wife is now reporting sore on top of Orozco hands. Patient cut his finger a couple weeks ago. Its now quite swollen \"a big bubble\" and is warm. They are wondering if they could be squeezed in today or what the nurse would recommend them doing?    Duration (how long have symptoms been present): swelling started this morning    Have you been treated for this before? Yes    Additional comments: none    Phone Number patient can be reached at:  [facility] [phone]    Best Time:  any    Can we leave a detailed message on this number:  YES    Call taken on 1/18/2018 at 11:44 AM by Abbie Sorto    "

## 2018-01-18 NOTE — NURSING NOTE
"Chief Complaint   Patient presents with     Finger       Initial /80  Pulse 76  Temp 97.6  F (36.4  C) (Tympanic)  Ht 5' 11\" (1.803 m)  Wt 250 lb (113.4 kg)  SpO2 96%  BMI 34.87 kg/m2 Estimated body mass index is 34.87 kg/(m^2) as calculated from the following:    Height as of this encounter: 5' 11\" (1.803 m).    Weight as of this encounter: 250 lb (113.4 kg).  Medication Reconciliation: complete    Health Maintenance that is potentially due pending provider review:  NONE    n/a    Is there anyone who you would like to be able to receive your results? No  If yes have patient fill out DOROTHY      "

## 2018-01-18 NOTE — TELEPHONE ENCOUNTER
Sore on L finger has gotten red and warm.  Pt has area on his hand above the finger that is swollen, warm and like a big bubble.  Infection?  Scheduled appt @ [facility].  KpaYelena

## 2018-02-02 ENCOUNTER — TELEPHONE (OUTPATIENT)
Dept: FAMILY MEDICINE | Facility: CLINIC | Age: 53
End: 2018-02-02

## 2018-02-02 NOTE — TELEPHONE ENCOUNTER
Reason for Call:  Medication or medication refill:    Do you use a [facility]?  Name of the pharmacy and phone number for the current request:  [facility] [place] [phone]    Name of the medication requested: Antibiotic    Other request: He was treated for his left hand middle finger knuckle cellulitis.   He had 10 days of antibiotics.  His finger is still swollen and warm.  Please advise.    Can we leave a detailed message on this number? YES    Phone number patient can be reached at: Other phone number:  Nabila  (wife)  [phone]    Best Time: any    Call taken on 2/2/2018 at 3:39 PM by Christiane Del Angel

## 2018-02-02 NOTE — TELEPHONE ENCOUNTER
"Spouse called and requested refill for patient for antibiotics given on 1/18/2018 in [facility].  Writer did not locate signed consent to discuss medical concerns with spouse.  Advised patient the provider would want to reevaluate the hand for antibiotic treatment.  Spouse was extremely upset and rude.  Spouse stated there is a consent for her to talk to regarding his health.  Spouse is extremely upset that my recommendations are for him to be seen.  Spouse is upset that there is no opening at 4:40pm.  SPouse reports\" so you want me to wait until he is super sick and needs to go to ER.\"  Advised spouse that is not what I was implying an he should be seen before he would need more serious treatment and that he should go to UC/ER.  Spouse stated we are done here and ended the call.    Elana ANGELO RN    "

## 2018-04-30 DIAGNOSIS — I10 HYPERTENSION GOAL BP (BLOOD PRESSURE) < 140/90: ICD-10-CM

## 2018-04-30 NOTE — TELEPHONE ENCOUNTER
"Requested Prescriptions   Pending Prescriptions Disp Refills     lisinopril-hydrochlorothiazide (PRINZIDE/ZESTORETIC) 10-12.5 MG per tablet [Pharmacy Med Name: LISINOPRIL-HCTZ 10-12.5 MG TAB]  Last Written Prescription Date:  03/22/17  Last Fill Quantity: 90,  # refills: 3   Last office visit: 1/18/2018 with prescribing provider:  01/18/18   Future Office Visit:   90 tablet 1     Sig: TAKE 1 TABLET BY MOUTH EVERY MORNING    Diuretics (Including Combos) Protocol Failed    4/30/2018  1:28 AM       Failed - Normal serum creatinine on file in past 12 months    Recent Labs   Lab Test  03/22/17   0745   CR  0.99          Failed - Normal serum potassium on file in past 12 months    Recent Labs   Lab Test  03/22/17   0745   POTASSIUM  3.9          Failed - Normal serum sodium on file in past 12 months    Recent Labs   Lab Test  03/22/17   0745   NA  140          Passed - Blood pressure under 140/90 in past 12 months    BP Readings from Last 3 Encounters:   01/18/18 120/80   01/02/18 (P) 139/86   12/28/17 155/86          Passed - Recent (12 mo) or future (30 days) visit within the authorizing provider's specialty    Patient had office visit in the last 12 months or has a visit in the next 30 days with authorizing provider or within the authorizing provider's specialty.  See \"Patient Info\" tab in inbasket, or \"Choose Columns\" in Meds & Orders section of the refill encounter.           Passed - Patient is age 18 or older        "

## 2018-05-01 RX ORDER — LISINOPRIL/HYDROCHLOROTHIAZIDE 10-12.5 MG
1 TABLET ORAL EVERY MORNING
Qty: 30 TABLET | Refills: 0 | Status: SHIPPED | OUTPATIENT
Start: 2018-05-01 | End: 2018-05-30

## 2018-05-01 NOTE — TELEPHONE ENCOUNTER
Small refill given asking the pharmacy to tell him of the need for labs and office visit. Blossom PHOENIX RN

## 2018-05-01 NOTE — TELEPHONE ENCOUNTER
Voicemail box not set up on mobile phone, home phone does not ring  Patient is due for appt and labs for refill on Prinzide  Is patient out of medication    Left message for patient to return call to clinic.    Angelica HUDDLESTON Rn

## 2018-05-30 ENCOUNTER — TELEPHONE (OUTPATIENT)
Dept: FAMILY MEDICINE | Facility: CLINIC | Age: 53
End: 2018-05-30

## 2018-05-30 DIAGNOSIS — I10 HYPERTENSION GOAL BP (BLOOD PRESSURE) < 140/90: ICD-10-CM

## 2018-05-30 NOTE — LETTER
[facility] [place]  [address]  [facility] [address]  Phone: [phone]        June 1, 2018      Juma Spivey                                                                                                                                [id number] [facility] [address]            Dear Mr. Spivey,    We are concerned about your health care.  We recently provided you with a medication refill.  Many medications require routine follow-up with your Doctor.      At this time we ask that: You schedule a routine office visit with your physician to follow your blood pressure.  Dr. Bryan Harry is no longer practicing at [facility], we can set you up with another one of our providers.  Please call us at [phone].    Your prescription: has been refilled x 14 days.  Please follow up as soon as possible.      Thank you,      Adri Harry MD/ geovany

## 2018-05-30 NOTE — TELEPHONE ENCOUNTER
"Requested Prescriptions   Pending Prescriptions Disp Refills     lisinopril-hydrochlorothiazide (PRINZIDE/ZESTORETIC) 10-12.5 MG per tablet [Pharmacy Med Name: LISINOPRIL-HCTZ 10-12.5 MG TAB]  Last Written Prescription Date:  05/01/2018  Last Fill Quantity: 30,  # refills: 0   Last office visit: 03/22/2017 pwith prescribing provider:  Piero  Future Office Visit:     30 tablet 0     Sig: TAKE 1 TABLET BY MOUTH EVERY MORNING NEEDS LABS AND OFFICE VISIT FOR FURTHER REFILLS    Diuretics (Including Combos) Protocol Failed    5/30/2018  1:33 AM       Failed - Normal serum creatinine on file in past 12 months    Recent Labs   Lab Test  03/22/17   0745   CR  0.99             Failed - Normal serum potassium on file in past 12 months    Recent Labs   Lab Test  03/22/17   0745   POTASSIUM  3.9                   Failed - Normal serum sodium on file in past 12 months    Recent Labs   Lab Test  03/22/17   0745   NA  140             Passed - Blood pressure under 140/90 in past 12 months    BP Readings from Last 3 Encounters:   01/18/18 120/80   01/02/18 (P) 139/86   12/28/17 155/86                Passed - Recent (12 mo) or future (30 days) visit within the authorizing provider's specialty    Patient had office visit in the last 12 months or has a visit in the next 30 days with authorizing provider or within the authorizing provider's specialty.  See \"Patient Info\" tab in inbasket, or \"Choose Columns\" in Meds & Orders section of the refill encounter.           Passed - Patient is age 18 or older          "

## 2018-05-31 RX ORDER — LISINOPRIL/HYDROCHLOROTHIAZIDE 10-12.5 MG
TABLET ORAL
Qty: 14 TABLET | Refills: 0 | Status: SHIPPED | OUTPATIENT
Start: 2018-05-31 | End: 2018-07-13

## 2018-05-31 NOTE — TELEPHONE ENCOUNTER
Last seen 3/2017. Headaches was advised 4/30 that he needed to be seen for labs and exam and refills. Ok for #14 days but needs OFFICE VISIT.

## 2018-05-31 NOTE — TELEPHONE ENCOUNTER
Routing refill request to provider for review/approval because:  Amelia given x1 and patient did not follow up, please advise  Labs not current:  See below    Delia OLVERA RN

## 2018-05-31 NOTE — TELEPHONE ENCOUNTER
Home phone does not work.  Cell phone VM has not been set up yet.  #14 sent to pharmacy.  Cindy HUDDLESTON RN

## 2018-07-13 ENCOUNTER — OFFICE VISIT (OUTPATIENT)
Dept: FAMILY MEDICINE | Facility: CLINIC | Age: 53
End: 2018-07-13
Payer: COMMERCIAL

## 2018-07-13 VITALS
RESPIRATION RATE: 16 BRPM | HEIGHT: 71 IN | BODY MASS INDEX: 33.4 KG/M2 | HEART RATE: 68 BPM | SYSTOLIC BLOOD PRESSURE: 120 MMHG | TEMPERATURE: 98.1 F | DIASTOLIC BLOOD PRESSURE: 80 MMHG | WEIGHT: 238.6 LBS | OXYGEN SATURATION: 96 %

## 2018-07-13 DIAGNOSIS — I10 HYPERTENSION GOAL BP (BLOOD PRESSURE) < 140/90: ICD-10-CM

## 2018-07-13 LAB
ANION GAP SERPL CALCULATED.3IONS-SCNC: 4 MMOL/L (ref 3–14)
BUN SERPL-MCNC: 18 MG/DL (ref 7–30)
CALCIUM SERPL-MCNC: 8.6 MG/DL (ref 8.5–10.1)
CHLORIDE SERPL-SCNC: 102 MMOL/L (ref 94–109)
CHOLEST SERPL-MCNC: 158 MG/DL
CO2 SERPL-SCNC: 30 MMOL/L (ref 20–32)
CREAT SERPL-MCNC: 1.07 MG/DL (ref 0.66–1.25)
GFR SERPL CREATININE-BSD FRML MDRD: 72 ML/MIN/1.7M2
GLUCOSE SERPL-MCNC: 89 MG/DL (ref 70–99)
HDLC SERPL-MCNC: 35 MG/DL
LDLC SERPL CALC-MCNC: 107 MG/DL
NONHDLC SERPL-MCNC: 123 MG/DL
POTASSIUM SERPL-SCNC: 4.2 MMOL/L (ref 3.4–5.3)
SODIUM SERPL-SCNC: 136 MMOL/L (ref 133–144)
TRIGL SERPL-MCNC: 78 MG/DL

## 2018-07-13 PROCEDURE — 80061 LIPID PANEL: CPT | Performed by: FAMILY MEDICINE

## 2018-07-13 PROCEDURE — 80048 BASIC METABOLIC PNL TOTAL CA: CPT | Performed by: FAMILY MEDICINE

## 2018-07-13 PROCEDURE — 36415 COLL VENOUS BLD VENIPUNCTURE: CPT | Performed by: FAMILY MEDICINE

## 2018-07-13 PROCEDURE — 99213 OFFICE O/P EST LOW 20 MIN: CPT | Performed by: FAMILY MEDICINE

## 2018-07-13 RX ORDER — LISINOPRIL/HYDROCHLOROTHIAZIDE 10-12.5 MG
1 TABLET ORAL DAILY
Qty: 90 TABLET | Refills: 3 | Status: SHIPPED | OUTPATIENT
Start: 2018-07-13 | End: 2019-07-09

## 2018-07-13 ASSESSMENT — PAIN SCALES - GENERAL: PAINLEVEL: NO PAIN (0)

## 2018-07-13 NOTE — PROGRESS NOTES
SUBJECTIVE:   Juma Spivey is a 52 year old male who presents to clinic today for the following health issues:      Hypertension Follow-up      Outpatient blood pressures are being checked at home.  Results are 128/80.    Low Salt Diet: no added salt      Amount of exercise or physical activity: 2-3 days/week for an average of 15-30 minutes    Problems taking medications regularly: No    Medication side effects: none    Diet: low salt    ADDITIONAL HPI: 53 year old male here for above issue.      ROS: 10 point review of systems negative except as per HPI.    PAST MEDICAL HISTORY:  Past Medical History:   Diagnosis Date     Other lymphedema         ACTIVE MEDICAL PROBLEMS:  Patient Active Problem List   Diagnosis     Lymphedema     Hypertension goal BP (blood pressure) < 140/90     Hyperlipidemia LDL goal <130     Overweight     Microscopic hematuria     Colon polyp        FAMILY HISTORY:  Family History   Problem Relation Age of Onset     Hypertension Mother      Neurologic Disorder Mother      brain bleeds     Alcohol/Drug Father      Breast Cancer Sister        SOCIAL HISTORY:  Social History     Social History     Marital status:      Spouse name: N/A     Number of children: N/A     Years of education: N/A     Occupational History     Not on file.     Social History Main Topics     Smoking status: Never Smoker     Smokeless tobacco: Never Used     Alcohol use Yes      Comment: occ.     Drug use: No     Sexual activity: Yes     Partners: Female     Other Topics Concern     Parent/Sibling W/ Cabg, Mi Or Angioplasty Before 65f 55m? No     Social History Narrative       MEDICATIONS:  Current Outpatient Prescriptions   Medication Sig Dispense Refill     lisinopril-hydrochlorothiazide (PRINZIDE/ZESTORETIC) 10-12.5 MG per tablet Take 1 tablet by mouth daily 90 tablet 3     cephALEXin (KEFLEX) 500 MG capsule Take 1 capsule (500 mg) by mouth 2 times daily (Patient not taking: Reported on 7/13/2018) 20  "capsule 0     fluticasone (FLONASE) 50 MCG/ACT nasal spray Spray 2 sprays into both nostrils daily Schedule appt for further refills. (Patient not taking: Reported on 1/18/2018) 1 g 1     ORDER FOR DME Compression stockings 1 pair 0       ALLERGIES:     Allergies   Allergen Reactions     Penicillins      Thinks he may be allergic because his father was       Problem list, Medication list, Allergies, and Medical/Social/Surgical histories [facility] and updated as appropriate.    OBJECTIVE:                                                    VITALS: /80  Pulse 68  Temp 98.1  F (36.7  C) (Tympanic)  Resp 16  Ht 5' 11\" (1.803 m)  Wt 238 lb 9.6 oz (108.2 kg)  SpO2 96%  BMI 33.28 kg/m2 Body mass index is 33.28 kg/(m^2).  GENERAL: Pleasant, well appearing male.  HEENT: PERRL, EOMI, oropharynx clear.  NECK: supple, no thyromegaly or thyroid masses, no lymphadenopathy.  CV: RRR, no murmurs, rubs or gallops.  LUNGS: Clear to auscultation bilaterally, normal effort.  ABDOMEN: Soft, non-distended, non-tender.  No hepatosplenomegaly or palpable masses.    SKIN: warm and dry without obvious rashes.   EXTREMITIES: No edema, normal pulses.     ASSESSMENT/PLAN:                                                    1. Hypertension goal BP (blood pressure) < 140/90  Well controlled. Refilled medication. Check labs.    - lisinopril-hydrochlorothiazide (PRINZIDE/ZESTORETIC) 10-12.5 MG per tablet; Take 1 tablet by mouth daily  Dispense: 90 tablet; Refill: 3  - Lipid Profile (Chol, Trig, HDL, LDL calc)  - Basic metabolic panel         "

## 2018-07-13 NOTE — MR AVS SNAPSHOT
After Visit Summary   7/13/2018    Juma Spivey    MRN: [id number]           Patient Information     Date Of Birth          1965        Visit Information        Provider Department      7/13/2018 7:20 AM Adri Harry MD [facility]        Today's Diagnoses     Hypertension goal BP (blood pressure) < 140/90          Care Instructions          Thank you for choosing [facility].  You may be receiving a survey in the [facility] from [facility] today.  Please take a few minutes to complete and return the survey to let us know how we are doing.      Our Clinic hours are:  Mondays    7:20 am - 7 pm  Tues - Fri  7:20 am - 5 pm    Clinic Phone: [phone]    The clinic lab opens at 7:30 am Mon - Fri and appointments are required.    [facility]  Ph. [phone]  Monday  8 am - 7pm  Tues - Fri 8 am - 5:30 pm                 Follow-ups after your visit        Who to contact     If you have questions or need follow up information about today's clinic visit or your schedule please contact [facility] directly at [phone].  Normal or non-critical lab and imaging results will be communicated to you by MyChart, letter or phone within 4 business days after the clinic has received the results. If you do not hear from us within 7 days, please contact the clinic through [employer] or phone. If you have a critical or abnormal lab result, we will notify you by phone as soon as possible.  Submit refill requests through [employer] or call your pharmacy and they will forward the refill request to us. Please allow 3 business days for your refill to be completed.          Additional Information About Your Visit        MyChart Information     [employer] gives you secure access to your electronic health record. If you see a primary care provider, you can also send messages to your care team and make appointments. If you have  "questions, please call your primary care clinic.  If you do not have a primary care provider, please call [phone] and they will assist you.        Care EveryWhere ID     This is your Care EveryWhere ID. This could be used by other organizations to access your [facility] medical records  [id number]        Your Vitals Were     Pulse Temperature Respirations Height Pulse Oximetry BMI (Body Mass Index)    68 98.1  F (36.7  C) (Tympanic) 16 5' 11\" (1.803 m) 96% 33.28 kg/m2       Blood Pressure from Last 3 Encounters:   07/13/18 120/80   01/18/18 120/80   01/02/18 (P) 139/86    Weight from Last 3 Encounters:   07/13/18 238 lb 9.6 oz (108.2 kg)   01/18/18 250 lb (113.4 kg)   01/02/18 258 lb (117 kg)              We Performed the Following     Basic metabolic panel     Lipid Profile (Chol, Trig, HDL, LDL calc)          Today's Medication Changes          These changes are accurate as of 7/13/18  7:50 AM.  If you have any questions, ask your nurse or doctor.               These medicines have changed or have updated prescriptions.        Dose/Directions    lisinopril-hydrochlorothiazide 10-12.5 MG per tablet   Commonly known as:  PRINZIDE/ZESTORETIC   This may have changed:  See the new instructions.   Used for:  Hypertension goal BP (blood pressure) < 140/90   Changed by:  Adri Harry MD        Dose:  1 tablet   Take 1 tablet by mouth daily   Quantity:  90 tablet   Refills:  3            Where to get your medicines      These medications were sent to [place] [address] [place] [address] [address]     Phone:  [phone]     lisinopril-hydrochlorothiazide 10-12.5 MG per tablet                Primary Care Provider    Bryan Harry MD       No address on file        Equal Access to Services     ARSENIO GARSIA AH: Hadii aad ku hadasho Socarlos, waaxda luqadaha, qaybta kaalmada adeegyada, waxay rao watts. So wac " [phone].    ATENCIÓN: Si gerard oseguera, tiene a anaya disposición servicios gratuitos de asistencia lingüística. Beck gill [phone].    We comply with applicable federal civil rights laws and [place] laws. We do not discriminate on the basis of race, color, national origin, age, disability, sex, sexual orientation, or gender identity.            Thank you!     Thank you for choosing [facility]  for your care. Our goal is always to provide you with excellent care. Hearing back from our patients is one way we can continue to improve our services. Please take a few minutes to complete the written survey that you may receive in the mail after your visit with us. Thank you!             Your Updated Medication List - Protect others around you: Learn how to safely use, store and throw away your medicines at www.disposemymeds.org.          This list is accurate as of 7/13/18  7:50 AM.  Always use your most recent med list.                   Brand Name Dispense Instructions for use Diagnosis    cephALEXin 500 MG capsule    KEFLEX    20 capsule    Take 1 capsule (500 mg) by mouth 2 times daily    Cellulitis of left upper extremity       fluticasone 50 MCG/ACT spray    FLONASE    1 g    Spray 2 sprays into both nostrils daily Schedule appt for further refills.    ETD (Eustachian tube dysfunction), left       lisinopril-hydrochlorothiazide 10-12.5 MG per tablet    PRINZIDE/ZESTORETIC    90 tablet    Take 1 tablet by mouth daily    Hypertension goal BP (blood pressure) < 140/90       order for DME     1 pair    Compression stockings    Other lymphedema

## 2018-07-13 NOTE — PATIENT INSTRUCTIONS
Thank you for choosing [facility].  You may be receiving a survey in the mail from [facility] regarding your visit today.  Please take a few minutes to complete and return the survey to let us know how we are doing.      Our Clinic hours are:  Mondays    7:20 am - 7 pm  Tues - Fri  7:20 am - 5 pm    Clinic Phone: [phone]    The clinic lab opens at 7:30 am Mon - Fri and appointments are required.    [facility]. [phone]  Monday  8 am - 7pm  Tues - Fri 8 am - 5:30 pm

## 2018-07-14 ASSESSMENT — PATIENT HEALTH QUESTIONNAIRE - PHQ9: SUM OF ALL RESPONSES TO PHQ QUESTIONS 1-9: 0

## 2019-04-10 ENCOUNTER — TELEPHONE (OUTPATIENT)
Dept: FAMILY MEDICINE | Facility: CLINIC | Age: 54
End: 2019-04-10

## 2019-04-10 ENCOUNTER — MEDICAL CORRESPONDENCE (OUTPATIENT)
Dept: HEALTH INFORMATION MANAGEMENT | Facility: CLINIC | Age: 54
End: 2019-04-10

## 2019-07-09 DIAGNOSIS — I10 HYPERTENSION GOAL BP (BLOOD PRESSURE) < 140/90: ICD-10-CM

## 2019-07-09 NOTE — TELEPHONE ENCOUNTER
"Requested Prescriptions   Pending Prescriptions Disp Refills     lisinopril-hydrochlorothiazide (PRINZIDE/ZESTORETIC) 10-12.5 MG tablet [Pharmacy Med Name: LISINOPRIL-HCTZ 10-12.5 MG TAB] 90 tablet 3     Sig: TAKE 1 TABLET BY MOUTH EVERY DAY  Last Written Prescription Date:  7/13/2018  Last Fill Quantity: 90,  # refills: 3   Last office visit: 7/13/2018 with prescribing provider:  Piero    Future Office Visit:           Diuretics (Including Combos) Protocol Passed - 7/9/2019  2:02 AM        Passed - Blood pressure under 140/90 in past 12 months     BP Readings from Last 3 Encounters:   07/13/18 120/80   01/18/18 120/80   01/02/18 (P) 139/86                 Passed - Recent (12 mo) or future (30 days) visit within the authorizing provider's specialty     Patient had office visit in the last 12 months or has a visit in the next 30 days with authorizing provider or within the authorizing provider's specialty.  See \"Patient Info\" tab in inbasket, or \"Choose Columns\" in Meds & Orders section of the refill encounter.              Passed - Medication is active on med list        Passed - Patient is age 18 or older        Passed - Normal serum creatinine on file in past 12 months     Recent Labs   Lab Test 07/13/18  0754   CR 1.07              Passed - Normal serum potassium on file in past 12 months     Recent Labs   Lab Test 07/13/18  0754   POTASSIUM 4.2                    Passed - Normal serum sodium on file in past 12 months     Recent Labs   Lab Test 07/13/18  0754                   "

## 2019-07-10 RX ORDER — LISINOPRIL/HYDROCHLOROTHIAZIDE 10-12.5 MG
TABLET ORAL
Qty: 30 TABLET | Refills: 0 | Status: SHIPPED | OUTPATIENT
Start: 2019-07-10 | End: 2019-09-20

## 2019-07-10 NOTE — TELEPHONE ENCOUNTER
Medication is being filled for 1 time refill only due to:  Patient needs to be seen because due for OV..   Patient notified.  Cindy HUDDLESTON RN

## 2019-09-20 ENCOUNTER — OFFICE VISIT (OUTPATIENT)
Dept: FAMILY MEDICINE | Facility: CLINIC | Age: 54
End: 2019-09-20
Payer: COMMERCIAL

## 2019-09-20 VITALS
TEMPERATURE: 98 F | WEIGHT: 240 LBS | SYSTOLIC BLOOD PRESSURE: 128 MMHG | HEART RATE: 70 BPM | BODY MASS INDEX: 32.51 KG/M2 | RESPIRATION RATE: 16 BRPM | HEIGHT: 72 IN | OXYGEN SATURATION: 96 % | DIASTOLIC BLOOD PRESSURE: 84 MMHG

## 2019-09-20 DIAGNOSIS — Z00.00 ROUTINE GENERAL MEDICAL EXAMINATION AT A HEALTH CARE FACILITY: Primary | ICD-10-CM

## 2019-09-20 DIAGNOSIS — Z13.220 LIPID SCREENING: ICD-10-CM

## 2019-09-20 DIAGNOSIS — L91.8 SKIN TAG: ICD-10-CM

## 2019-09-20 DIAGNOSIS — Z71.89 ADVANCED DIRECTIVES, COUNSELING/DISCUSSION: ICD-10-CM

## 2019-09-20 DIAGNOSIS — I10 HYPERTENSION GOAL BP (BLOOD PRESSURE) < 140/90: ICD-10-CM

## 2019-09-20 LAB
ANION GAP SERPL CALCULATED.3IONS-SCNC: 2 MMOL/L (ref 3–14)
BUN SERPL-MCNC: 20 MG/DL (ref 7–30)
CALCIUM SERPL-MCNC: 8.3 MG/DL (ref 8.5–10.1)
CHLORIDE SERPL-SCNC: 103 MMOL/L (ref 94–109)
CHOLEST SERPL-MCNC: 192 MG/DL
CO2 SERPL-SCNC: 31 MMOL/L (ref 20–32)
CREAT SERPL-MCNC: 1 MG/DL (ref 0.66–1.25)
GFR SERPL CREATININE-BSD FRML MDRD: 84 ML/MIN/{1.73_M2}
GLUCOSE SERPL-MCNC: 95 MG/DL (ref 70–99)
HDLC SERPL-MCNC: 41 MG/DL
LDLC SERPL CALC-MCNC: 135 MG/DL
NONHDLC SERPL-MCNC: 151 MG/DL
POTASSIUM SERPL-SCNC: 4 MMOL/L (ref 3.4–5.3)
SODIUM SERPL-SCNC: 136 MMOL/L (ref 133–144)
TRIGL SERPL-MCNC: 81 MG/DL

## 2019-09-20 PROCEDURE — 80061 LIPID PANEL: CPT | Performed by: FAMILY MEDICINE

## 2019-09-20 PROCEDURE — 11200 RMVL SKIN TAGS UP TO&INC 15: CPT | Performed by: FAMILY MEDICINE

## 2019-09-20 PROCEDURE — 80048 BASIC METABOLIC PNL TOTAL CA: CPT | Performed by: FAMILY MEDICINE

## 2019-09-20 PROCEDURE — 99396 PREV VISIT EST AGE 40-64: CPT | Mod: 25 | Performed by: FAMILY MEDICINE

## 2019-09-20 PROCEDURE — 36415 COLL VENOUS BLD VENIPUNCTURE: CPT | Performed by: FAMILY MEDICINE

## 2019-09-20 RX ORDER — LISINOPRIL/HYDROCHLOROTHIAZIDE 10-12.5 MG
1 TABLET ORAL DAILY
Qty: 90 TABLET | Refills: 3 | Status: SHIPPED | OUTPATIENT
Start: 2019-09-20 | End: 2020-09-11

## 2019-09-20 ASSESSMENT — MIFFLIN-ST. JEOR: SCORE: 1953.69

## 2019-09-20 ASSESSMENT — PAIN SCALES - GENERAL: PAINLEVEL: NO PAIN (0)

## 2019-09-20 NOTE — PATIENT INSTRUCTIONS
Our Clinic hours are:  Mondays    7:20 am - 7 pm  Tues - Fri  7:20 am - 5 pm    Clinic Phone: [phone]    The clinic lab opens at 7:30 am Mon - Fri and appointments are required.    [facility]. [phone]  Monday  8 am - 7pm  Tues - Fri 8 am - 5:30 pm         Preventive Health Recommendations  Male Ages 50 - 64    Yearly exam:             See your health care provider every year in order to  o   Review health changes.   o   Discuss preventive care.    o   Review your medicines if your doctor has prescribed any.     Have a cholesterol test every 5 years, or more frequently if you are at risk for high cholesterol/heart disease.     Have a diabetes test (fasting glucose) every three years. If you are at risk for diabetes, you should have this test more often.     Have a colonoscopy at age 50, or have a yearly FIT test (stool test). These exams will check for colon cancer.      Talk with your health care provider about whether or not a prostate cancer screening test (PSA) is right for you.    You should be tested each year for STDs (sexually transmitted diseases), if you re at risk.     Shots: Get a flu shot each year. Get a tetanus shot every 10 years.     Nutrition:    Eat at least 5 servings of fruits and vegetables daily.     Eat whole-grain bread, whole-wheat pasta and brown rice instead of white grains and rice.     Get adequate Calcium and Vitamin D.     Lifestyle    Exercise for at least 150 minutes a week (30 minutes a day, 5 days a week). This will help you control your weight and prevent disease.     Limit alcohol to one drink per day.     No smoking.     Wear sunscreen to prevent skin cancer.     See your dentist every six months for an exam and cleaning.     See your eye doctor every 1 to 2 years.

## 2019-09-20 NOTE — PROGRESS NOTES
SUBJECTIVE:   CC: Juma Spivey is an 54 year old male who presents for preventive health visit.     Chief Complaint   Patient presents with     Physical     rx refill     Skin Tags     under left arm.       Healthy Habits:    Do you get at least three servings of calcium containing foods daily (dairy, green leafy vegetables, etc.)? yes    Amount of exercise or daily activities, outside of work: 3 day(s) per week    Problems taking medications regularly No    Medication side effects: No    Have you had an eye exam in the past two years? yes    Do you see a dentist twice per year? yes    Do you have sleep apnea, excessive snoring or daytime drowsiness?no      Hypertension Follow-up      Do you check your blood pressure regularly outside of the clinic? Yes     Are you following a low salt diet? Yes    Are your blood pressures ever more than 140 on the top number (systolic) OR more   than 90 on the bottom number (diastolic), for example 140/90? No    Today's PHQ-2 Score:   PHQ-2 ( 1999 [employer]) 7/13/2018 12/28/2017   Q1: Little interest or pleasure in doing things 0 0   Q2: Feeling down, depressed or hopeless 0 0   PHQ-2 Score 0 0       Abuse: Current or Past(Physical, Sexual or Emotional)- No  Do you feel safe in your environment? Yes    Social History     Tobacco Use     Smoking status: Never Smoker     Smokeless tobacco: Never Used   Substance Use Topics     Alcohol use: Yes     Comment: occ.     If you drink alcohol do you typically have >3 drinks per day or >7 drinks per week? No                      Last PSA: No results found for: PSA    Reviewed orders with patient. Reviewed health maintenance and updated orders accordingly - Yes  Labs reviewed in EPIC  Patient Active Problem List   Diagnosis     Lymphedema     Hypertension goal BP (blood pressure) < 140/90     Hyperlipidemia LDL goal <130     Overweight     Microscopic hematuria     Colon polyp     Advanced directives, counseling/discussion     History  reviewed. No pertinent surgical history.    Social History     Tobacco Use     Smoking status: Never Smoker     Smokeless tobacco: Never Used   Substance Use Topics     Alcohol use: Yes     Comment: occ.     Family History   Problem Relation Age of Onset     Hypertension Mother      Neurologic Disorder Mother         brain bleeds     Alcohol/Drug Father      Breast Cancer Sister          Current Outpatient Medications   Medication Sig Dispense Refill     lisinopril-hydrochlorothiazide (PRINZIDE/ZESTORETIC) 10-12.5 MG tablet Take 1 tablet by mouth daily 90 tablet 3     ORDER FOR DME Compression stockings 1 pair 0     Allergies   Allergen Reactions     Penicillins      Thinks he may be allergic because his father was       Reviewed and updated as needed this visit by clinical staff  Tobacco  Allergies  Meds  Med Hx  Surg Hx  Fam Hx  Soc Hx        Reviewed and updated as needed this visit by Provider        Past Medical History:   Diagnosis Date     Other lymphedema       History reviewed. No pertinent surgical history.    ROS:  Constitutional, neuro, ENT, endocrine, pulmonary, cardiac, gastrointestinal, genitourinary, musculoskeletal, integument and psychiatric systems are negative, except as otherwise noted.     OBJECTIVE:   There were no vitals taken for this visit.  EXAM:  GENERAL: healthy, alert and no distress  EYES: Eyes grossly normal to inspection, PERRL and conjunctivae and sclerae normal  HENT: ear canals and TM's normal, nose and mouth without ulcers or lesions  NECK: no adenopathy, no asymmetry, masses, or scars and thyroid normal to palpation  RESP: lungs clear to auscultation - no rales, rhonchi or wheezes  CV: regular rate and rhythm, normal S1 S2, no S3 or S4, no murmur, click or rub, no peripheral edema and peripheral pulses strong  ABDOMEN: soft, nontender, no hepatosplenomegaly, no masses and bowel sounds normal  MS: no gross musculoskeletal defects noted, no edema  SKIN: no suspicious lesions  "or rashes  NEURO: Normal strength and tone, mentation intact and speech normal  PSYCH: mentation appears normal, affect normal/bright    Procedure:    Skin tag removal.    Procedure: Had verbal informed consent discussion of risks of excision including: infection, bleeding, scarring, incomplete excision, and missed diagnosis.  Area was cleaned and draped in normal sterile fashion. Anesthetized with lidocain 1% with epinephrine. Skin tag was excised with scissors. There was a robust vessel in the center of the stalk. Initially applied aluminum chloride for hemostasis but this did not control bleeding. Ultimately this needed placement of 2 simple interrupted sutures of 4-0 Nylon. Dressed with bacitracin and a Band-Aid. Wound care instructions reviewed. Suture removal in 10 days.    ASSESSMENT/PLAN:   1. Routine general medical examination at a health care facility    2. Hypertension goal BP (blood pressure) < 140/90  Well controlled. Refilled medication. Check labs.    - lisinopril-hydrochlorothiazide (PRINZIDE/ZESTORETIC) 10-12.5 MG tablet; Take 1 tablet by mouth daily  Dispense: 90 tablet; Refill: 3  - Basic metabolic panel    3. Skin tag  Removed as above.  - Surgical pathology exam    4. Advanced directives, counseling/discussion    5. Lipid screening  - Lipid panel reflex to direct LDL Fasting    COUNSELING:  Reviewed preventive health counseling, as reflected in patient instructions    Estimated body mass index is 33.28 kg/m  as calculated from the following:    Height as of 7/13/18: 1.803 m (5' 11\").    Weight as of 7/13/18: 108.2 kg (238 lb 9.6 oz).    Weight management plan: Discussed healthy diet and exercise guidelines     reports that he has never smoked. He has never used smokeless tobacco.      Counseling Resources:  ATP IV Guidelines  Pooled Cohorts Equation Calculator  FRAX Risk Assessment  ICSI Preventive Guidelines  Dietary Guidelines for Americans, 2010  USDA's MyPlate  ASA Prophylaxis  Lung CA " Mitul Harry MD  [facility]

## 2019-09-25 LAB — COPATH REPORT: NORMAL

## 2019-09-25 NOTE — RESULT ENCOUNTER NOTE
Notified via [employer]: Cholesterol mildly elevated.  Not abnormal enough at this point to warrant medication changes but I would recommend: increasing daily exercise, increasing dietary fiber, eliminating trans fats and reducing saturated fats. Calcium mildly low. I would recommend optimizing calcium and vitamin D. I would recommend 1500 mg of calcium daily along with 1000 international units of vitamin D daily. No indication for additional treatment this time.

## 2019-09-26 NOTE — RESULT ENCOUNTER NOTE
Please call the patient with the results. Notify that skin lesion was benign. No additional treatment needed.  There was a focal verrucoid keratosis (warty skin growth) within it which likely contributed to the increased blood flow to it.

## 2019-09-30 ENCOUNTER — OFFICE VISIT (OUTPATIENT)
Dept: FAMILY MEDICINE | Facility: CLINIC | Age: 54
End: 2019-09-30
Payer: COMMERCIAL

## 2019-09-30 DIAGNOSIS — Z48.02 VISIT FOR SUTURE REMOVAL: Primary | ICD-10-CM

## 2019-09-30 PROCEDURE — 99207 ZZC NO CHARGE NURSE ONLY: CPT

## 2019-09-30 NOTE — PROGRESS NOTES
Suture removal:     Date sutures applied: 9/20/19         Where (setting) in which they applied: Clinic,removal of skin tag with bleeding.    Description:  Type: sutures  Location: lower L axilla.    History:    Removal of skin tag.    Accompanying Signs & Symptoms: (staff: if yes-describe)  Redness: no  Warmth: no  Drainage: no  Still bleeding: no  Fevers: no    Last tetanus shot: last tetanus booster within 10 years    Eda

## 2019-11-03 ENCOUNTER — HEALTH MAINTENANCE LETTER (OUTPATIENT)
Age: 54
End: 2019-11-03

## 2020-01-15 ENCOUNTER — TELEPHONE (OUTPATIENT)
Dept: FAMILY MEDICINE | Facility: CLINIC | Age: 55
End: 2020-01-15

## 2020-01-15 NOTE — TELEPHONE ENCOUNTER
"Patient states he has had a sore throat \"feels like I am swallowing glass\", headache, nausea, body aches, fatigue, warm feeling, and a cough that started on Monday. He is not sure if he has a fever or not as he has no means to check. He denies any shortness of breath. He will see Dr Harry tomorrow morning as he does not wish to be seen in /[facility] on his way home but will go there if need be before his appointment tomorrow.    Fatoumata Carroll RN    "

## 2020-01-15 NOTE — TELEPHONE ENCOUNTER
Reason for Call:  Other appointment    Detailed comments: Pt wife is calling and stating that Juma has the following:  Warm, fever, sore throat, Head ache, body aches, exhaustion, coughing and wondering about a appt.  Please advise    Phone Number Patient can be reached at: Home number on file Rae is at [phone]     Best Time: any    Can we leave a detailed message on this number? YES    Call taken on 1/15/2020 at 1:55 PM by Blossom Delgado

## 2020-01-16 ENCOUNTER — OFFICE VISIT (OUTPATIENT)
Dept: FAMILY MEDICINE | Facility: CLINIC | Age: 55
End: 2020-01-16
Payer: COMMERCIAL

## 2020-01-16 VITALS
SYSTOLIC BLOOD PRESSURE: 110 MMHG | TEMPERATURE: 99.2 F | HEART RATE: 85 BPM | DIASTOLIC BLOOD PRESSURE: 70 MMHG | OXYGEN SATURATION: 98 % | BODY MASS INDEX: 33.32 KG/M2 | RESPIRATION RATE: 16 BRPM | HEIGHT: 72 IN | WEIGHT: 246 LBS

## 2020-01-16 DIAGNOSIS — J10.1 INFLUENZA B: Primary | ICD-10-CM

## 2020-01-16 DIAGNOSIS — J02.9 SORE THROAT: ICD-10-CM

## 2020-01-16 DIAGNOSIS — R50.9 FEVER, UNSPECIFIED FEVER CAUSE: ICD-10-CM

## 2020-01-16 LAB
DEPRECATED S PYO AG THROAT QL EIA: NORMAL
FLUAV+FLUBV AG SPEC QL: NEGATIVE
FLUAV+FLUBV AG SPEC QL: POSITIVE
SPECIMEN SOURCE: ABNORMAL
SPECIMEN SOURCE: NORMAL

## 2020-01-16 PROCEDURE — 87880 STREP A ASSAY W/OPTIC: CPT | Performed by: FAMILY MEDICINE

## 2020-01-16 PROCEDURE — 87081 CULTURE SCREEN ONLY: CPT | Performed by: FAMILY MEDICINE

## 2020-01-16 PROCEDURE — 87804 INFLUENZA ASSAY W/OPTIC: CPT | Performed by: FAMILY MEDICINE

## 2020-01-16 PROCEDURE — 99214 OFFICE O/P EST MOD 30 MIN: CPT | Performed by: FAMILY MEDICINE

## 2020-01-16 RX ORDER — OSELTAMIVIR PHOSPHATE 75 MG/1
75 CAPSULE ORAL 2 TIMES DAILY
Qty: 10 CAPSULE | Refills: 0 | Status: SHIPPED | OUTPATIENT
Start: 2020-01-16 | End: 2020-01-21

## 2020-01-16 ASSESSMENT — MIFFLIN-ST. JEOR: SCORE: 1980.91

## 2020-01-16 NOTE — PROGRESS NOTES
Subjective     Juma Spivey is a 54 year old male who presents to clinic today for the following health issues:    HPI   ENT Symptoms             Symptoms: cc Present Absent Comment   Fever/Chills  x     Fatigue  x  Unable to sleep   Muscle Aches  x  mild   Eye Irritation   x    Sneezing   x    Nasal Norris/Drg  x     Sinus Pressure/Pain  x     Loss of smell   x    Dental pain  x     Sore Throat x x     Swollen Glands  x     Ear Pain/Fullness  x     Cough  x     Wheeze   x    Chest Pain   x    Shortness of breath   x    Rash   x    Other  x  Upset set stomach     Symptom duration:  3 days   Symptom severity:  mod   Treatments tried:  Ibuprofen   Contacts:  none     ROS: 10 point review of systems negative except as per HPI.    PAST MEDICAL HISTORY:  Past Medical History:   Diagnosis Date     Other lymphedema         ACTIVE MEDICAL PROBLEMS:  Patient Active Problem List   Diagnosis     Lymphedema     Hypertension goal BP (blood pressure) < 140/90     Hyperlipidemia LDL goal <130     Overweight     Microscopic hematuria     Colon polyp     Advanced directives, counseling/discussion        FAMILY HISTORY:  Family History   Problem Relation Age of Onset     Hypertension Mother      Neurologic Disorder Mother         brain bleeds     Alcohol/Drug Father      Breast Cancer Sister        SOCIAL HISTORY:  Social History     Socioeconomic History     Marital status:      Spouse name: Not on file     Number of children: Not on file     Years of education: Not on file     Highest education level: Not on file   Occupational History     Not on file   Social Needs     Financial resource strain: Not on file     Food insecurity:     Worry: Not on file     Inability: Not on file     Transportation needs:     Medical: Not on file     Non-medical: Not on file   Tobacco Use     Smoking status: Never Smoker     Smokeless tobacco: Never Used   Substance and Sexual Activity     Alcohol use: Yes     Comment: occ.     Drug use:  "No     Sexual activity: Yes     Partners: Female   Lifestyle     Physical activity:     Days per week: Not on file     Minutes per session: Not on file     Stress: Not on file   Relationships     Social connections:     Talks on phone: Not on file     Gets together: Not on file     Attends [employer] service: Not on file     Active member of club or organization: Not on file     Attends meetings of clubs or organizations: Not on file     Relationship status: Not on file     Intimate partner violence:     Fear of current or ex partner: Not on file     Emotionally abused: Not on file     Physically abused: Not on file     Forced sexual activity: Not on file   Other Topics Concern     Parent/sibling w/ CABG, MI or angioplasty before 65F 55M? No   Social History Narrative     Not on file       MEDICATIONS:  Current Outpatient Medications   Medication Sig Dispense Refill     lisinopril-hydrochlorothiazide (PRINZIDE/ZESTORETIC) 10-12.5 MG tablet Take 1 tablet by mouth daily 90 tablet 3     ORDER FOR DME Compression stockings 1 pair 0     oseltamivir (TAMIFLU) 75 MG capsule Take 1 capsule (75 mg) by mouth 2 times daily for 5 days 10 capsule 0       ALLERGIES:     Allergies   Allergen Reactions     Penicillins      Thinks he may be allergic because his father was         OBJECTIVE:                                                    VITALS: /70 (BP Location: Right arm, Patient Position: Chair, Cuff Size: Adult Large)   Pulse 85   Temp 99.2  F (37.3  C) (Oral)   Resp 16   Ht 1.816 m (5' 11.5\")   Wt 111.6 kg (246 lb)   SpO2 98%   BMI 33.83 kg/m    GENERAL: Pleasant, well appearing male.  HEENT: PERRL, EOMI, oropharynx normal, TMs normal, Nares boggy nasal mucosa with mucoid drainage.   NECK: supple, no thyromegaly or thyroid masses, no anterior cervical lymphadenopathy.  CV: RRR, no murmurs, rubs or gallops.  LUNGS: CTAB, normal effort.  SKIN: warm and dry without obvious rashes.   EXTREMITIES: No edema.    Results " for orders placed or performed in visit on 01/16/20   Rapid strep screen     Status: None   Result Value Ref Range    Specimen Description Throat     Rapid Strep A Screen       NEGATIVE: No Group A streptococcal antigen detected by immunoassay, await culture report.   Influenza A/B antigen     Status: Abnormal   Result Value Ref Range    Influenza A/B Agn Specimen Nasal     Influenza A Negative NEG^Negative    Influenza B Positive (A) NEG^Negative      ASSESSMENT/PLAN:                                                    1. Influenza B  Fevers started yesterday. Start tamiflu.  Discussed use and side effects. OTC symptomatic cares discussed.  Discussed risks of co-infection/pneumonia etc and respiratory risks associated with influenza. 911 if any respiratory distress.   - oseltamivir (TAMIFLU) 75 MG capsule; Take 1 capsule (75 mg) by mouth 2 times daily for 5 days  Dispense: 10 capsule; Refill: 0    2. Sore throat  - Rapid strep screen  - Beta strep group A culture    3. Fever, unspecified fever cause  - Influenza A/B antigen      Follow-up: If not improving or if worsening

## 2020-01-16 NOTE — PATIENT INSTRUCTIONS
Our Clinic hours are:  Mondays    7:20 am - 7 pm  Tues -  Fri  7:20 am - 5 pm    Clinic Phone: [phone]    The clinic lab opens at 7:30 am Mon - Fri and appointments are required.    [facility]. [phone]  Monday  8 am - 7pm  Tues - Fri 8 am - 5:30 pm

## 2020-01-16 NOTE — LETTER
January 17, 2020      Juma Trankenneth  [id number] [facility] [address]        The results of your 24 hour throat culture were negative. If you have any further questions please contact your clinic.            Sincerely,        Adri Harry MD

## 2020-01-17 LAB
BACTERIA SPEC CULT: NORMAL
SPECIMEN SOURCE: NORMAL

## 2020-03-26 ENCOUNTER — TELEPHONE (OUTPATIENT)
Dept: FAMILY MEDICINE | Facility: CLINIC | Age: 55
End: 2020-03-26

## 2020-03-26 NOTE — TELEPHONE ENCOUNTER
Reason for Call: Request for an order or referral:    Order or referral being requested: Order for [facility] signed and faxed back - Copy to scanning     Date needed: at your convenience    Has the patient been seen by the PCP for this problem? NO    Additional comments:     Call taken on 3/26/2020 at 10:55 AM by Hanna Sauceda

## 2020-09-10 ENCOUNTER — OFFICE VISIT (OUTPATIENT)
Dept: FAMILY MEDICINE | Facility: CLINIC | Age: 55
End: 2020-09-10
Payer: COMMERCIAL

## 2020-09-10 VITALS
OXYGEN SATURATION: 96 % | BODY MASS INDEX: 32.86 KG/M2 | HEIGHT: 72 IN | HEART RATE: 70 BPM | RESPIRATION RATE: 16 BRPM | TEMPERATURE: 97.8 F | SYSTOLIC BLOOD PRESSURE: 112 MMHG | DIASTOLIC BLOOD PRESSURE: 70 MMHG | WEIGHT: 242.6 LBS

## 2020-09-10 DIAGNOSIS — R19.09 UMBILICAL MASS: Primary | ICD-10-CM

## 2020-09-10 PROCEDURE — 99214 OFFICE O/P EST MOD 30 MIN: CPT | Performed by: FAMILY MEDICINE

## 2020-09-10 ASSESSMENT — PAIN SCALES - GENERAL: PAINLEVEL: MILD PAIN (2)

## 2020-09-10 ASSESSMENT — MIFFLIN-ST. JEOR: SCORE: 1960.49

## 2020-09-10 NOTE — PATIENT INSTRUCTIONS
Our Clinic hours are:  Mondays    7:20 am - 7 pm  Tues -  Fri  7:20 am - 5 pm    Clinic Phone: [phone]    The clinic lab opens at 7:30 am Mon - Fri and appointments are required.    [facility]. [phone]  Monday  8 am - 7pm  Tues - Fri 8 am - 5:30 pm

## 2020-09-10 NOTE — PROGRESS NOTES
"Subjective     Juma Spivey is a 55 year old male who presents to clinic today for the following health issues:    HPI       Problem:   Chief Complaint   Patient presents with     Mass     ? hernia - mid abdomen tenderness,       Review of Systems   Constitutional, neuro, ENT, endocrine, pulmonary, cardiac, gastrointestinal, genitourinary, musculoskeletal, integument and psychiatric systems are negative, except as otherwise noted.       Objective    /70   Pulse 70   Temp 97.8  F (36.6  C) (Tympanic)   Resp 16   Ht 1.816 m (5' 11.5\")   Wt 110 kg (242 lb 9.6 oz)   SpO2 96%   BMI 33.36 kg/m    Body mass index is 33.36 kg/m .  Physical Exam   GENERAL: Pleasant, well appearing male.  ABDOMEN: Soft, nondistended, no hepatosplenomegaly. Palpable, rubbery mass just superior to the umbilicus 1.5 cm in diameter. Smooth, well defined edges. Mildly tent to palpation. No palpable abdominal wall defect. Mass does not reduce.            Assessment & Plan     Umbilical mass  Given history of of umbilical drainage ?urachal cysts vs hernia. I think U/S would be helpful to delineate this better. Discussed surgery consult for definitive evaluation and management. Discussed signs and symptoms of of infection.   - US Hernia Evaluation; Future     BMI:   Estimated body mass index is 33.36 kg/m  as calculated from the following:    Height as of this encounter: 1.816 m (5' 11.5\").    Weight as of this encounter: 110 kg (242 lb 9.6 oz).         Return in about 3 days (around 9/13/2020) for ultrasound.    Adri Harry MD  [facility]    "

## 2020-09-13 ENCOUNTER — HOSPITAL ENCOUNTER (OUTPATIENT)
Dept: ULTRASOUND IMAGING | Facility: CLINIC | Age: 55
Discharge: HOME | End: 2020-09-13
Attending: FAMILY MEDICINE | Admitting: FAMILY MEDICINE
Payer: COMMERCIAL

## 2020-09-13 DIAGNOSIS — R19.09 UMBILICAL MASS: ICD-10-CM

## 2020-09-13 PROCEDURE — 76700 US EXAM ABDOM COMPLETE: CPT

## 2020-09-16 NOTE — RESULT ENCOUNTER NOTE
Notified via [employer]: No signs of hernia. Unless it's bothering you, there's nothing more that needs to be done.  If it does flare up again then we could have you see general surgery to discuss management.

## 2020-10-05 DIAGNOSIS — I10 HYPERTENSION GOAL BP (BLOOD PRESSURE) < 140/90: ICD-10-CM

## 2020-10-06 RX ORDER — LISINOPRIL/HYDROCHLOROTHIAZIDE 10-12.5 MG
TABLET ORAL
Qty: 30 TABLET | Refills: 0 | OUTPATIENT
Start: 2020-10-06

## 2020-10-06 NOTE — TELEPHONE ENCOUNTER
He's still due for the labs we futured 9/11. Have him come and do those and then I can refill. OK to refill x 1 month if he will run out prior to being able to get labs done.

## 2020-10-06 NOTE — TELEPHONE ENCOUNTER
"I called him, he says he has a \"lot of pills\" left at home and the pharmacy \"requested them early\"  He will have the fasting labs before he runs out of pills.   Kala Clark RNC    "

## 2020-10-06 NOTE — TELEPHONE ENCOUNTER
Routing refill request to provider for review/approval because:  Amelia given x1 and patient did not follow up, please advise  Labs not current    Delia OLVERA RN, BSN

## 2020-10-06 NOTE — TELEPHONE ENCOUNTER
"Requested Prescriptions   Pending Prescriptions Disp Refills     lisinopril-hydrochlorothiazide (ZESTORETIC) 10-12.5 MG tablet [Pharmacy Med Name: LISINOPRIL-HCTZ 10-12.5 MG TAB] 30 tablet 0     Sig: TAKE 1 TABLET BY MOUTH EVERY DAY       Diuretics (Including Combos) Protocol Failed - 10/5/2020  1:32 PM        Failed - Normal serum creatinine on file in past 12 months     Recent Labs   Lab Test 09/20/19  0820   CR 1.00              Failed - Normal serum potassium on file in past 12 months     Recent Labs   Lab Test 09/20/19  0820   POTASSIUM 4.0                    Failed - Normal serum sodium on file in past 12 months     Recent Labs   Lab Test 09/20/19  0820                 Passed - Blood pressure under 140/90 in past 12 months     BP Readings from Last 3 Encounters:   09/10/20 112/70   01/16/20 110/70 09/20/19 128/84                 Passed - Recent (12 mo) or future (30 days) visit within the authorizing provider's specialty     Patient has had an office visit with the authorizing provider or a provider within the authorizing providers department within the previous 12 mos or has a future within next 30 days. See \"Patient Info\" tab in inbasket, or \"Choose Columns\" in Meds & Orders section of the refill encounter.              Passed - Medication is active on med list        Passed - Patient is age 18 or older       ACE Inhibitors (Including Combos) Protocol Failed - 10/5/2020  1:32 PM        Failed - Normal serum creatinine on file in past 12 months     Recent Labs   Lab Test 09/20/19  0820   CR 1.00       Ok to refill medication if creatinine is low          Failed - Normal serum potassium on file in past 12 months     Recent Labs   Lab Test 09/20/19  0820   POTASSIUM 4.0             Passed - Blood pressure under 140/90 in past 12 months     BP Readings from Last 3 Encounters:   09/10/20 112/70   01/16/20 110/70 09/20/19 128/84                 Passed - Recent (12 mo) or future (30 days) visit within the " "authorizing provider's specialty     Patient has had an office visit with the authorizing provider or a provider within the authorizing providers department within the previous 12 mos or has a future within next 30 days. See \"Patient Info\" tab in inbasket, or \"Choose Columns\" in Meds & Orders section of the refill encounter.              Passed - Medication is active on med list        Passed - Patient is age 18 or older             "

## 2020-10-26 DIAGNOSIS — E78.5 HYPERLIPIDEMIA LDL GOAL <130: ICD-10-CM

## 2020-10-26 DIAGNOSIS — I10 HYPERTENSION GOAL BP (BLOOD PRESSURE) < 140/90: ICD-10-CM

## 2020-10-26 LAB
ANION GAP SERPL CALCULATED.3IONS-SCNC: 2 MMOL/L (ref 3–14)
BUN SERPL-MCNC: 16 MG/DL (ref 7–30)
CALCIUM SERPL-MCNC: 8.8 MG/DL (ref 8.5–10.1)
CHLORIDE SERPL-SCNC: 108 MMOL/L (ref 94–109)
CHOLEST SERPL-MCNC: 180 MG/DL
CO2 SERPL-SCNC: 31 MMOL/L (ref 20–32)
CREAT SERPL-MCNC: 1.06 MG/DL (ref 0.66–1.25)
GFR SERPL CREATININE-BSD FRML MDRD: 78 ML/MIN/{1.73_M2}
GLUCOSE SERPL-MCNC: 95 MG/DL (ref 70–99)
HDLC SERPL-MCNC: 42 MG/DL
LDLC SERPL CALC-MCNC: 123 MG/DL
NONHDLC SERPL-MCNC: 138 MG/DL
POTASSIUM SERPL-SCNC: 4.2 MMOL/L (ref 3.4–5.3)
SODIUM SERPL-SCNC: 141 MMOL/L (ref 133–144)
TRIGL SERPL-MCNC: 76 MG/DL

## 2020-10-26 PROCEDURE — 80048 BASIC METABOLIC PNL TOTAL CA: CPT | Performed by: FAMILY MEDICINE

## 2020-10-26 PROCEDURE — 80061 LIPID PANEL: CPT | Performed by: FAMILY MEDICINE

## 2020-10-27 NOTE — RESULT ENCOUNTER NOTE
Notified via [employer]: Cholesterol mildly elevated.  Not abnormal enough at this point to warrant medication changes but I would recommend: increasing daily exercise, increasing dietary fiber, eliminating trans fats and reducing saturated fats. Otherwise labs look good.

## 2020-11-07 DIAGNOSIS — I10 HYPERTENSION GOAL BP (BLOOD PRESSURE) < 140/90: ICD-10-CM

## 2020-11-09 NOTE — TELEPHONE ENCOUNTER
"Requested Prescriptions   Pending Prescriptions Disp Refills     lisinopril-hydrochlorothiazide (ZESTORETIC) 10-12.5 MG tablet [Pharmacy Med Name: LISINOPRIL-HCTZ 10-12.5 MG TAB] 30 tablet 0     Sig: TAKE 1 TABLET BY MOUTH EVERY DAY       Diuretics (Including Combos) Protocol Passed - 11/7/2020 12:17 PM        Passed - Blood pressure under 140/90 in past 12 months     BP Readings from Last 3 Encounters:   09/10/20 112/70   01/16/20 110/70   09/20/19 128/84                 Passed - Recent (12 mo) or future (30 days) visit within the authorizing provider's specialty     Patient has had an office visit with the authorizing provider or a provider within the authorizing providers department within the previous 12 mos or has a future within next 30 days. See \"Patient Info\" tab in inbasket, or \"Choose Columns\" in Meds & Orders section of the refill encounter.              Passed - Medication is active on med list        Passed - Patient is age 18 or older        Passed - Normal serum creatinine on file in past 12 months     Recent Labs   Lab Test 10/26/20  0653   CR 1.06              Passed - Normal serum potassium on file in past 12 months     Recent Labs   Lab Test 10/26/20  0653   POTASSIUM 4.2                    Passed - Normal serum sodium on file in past 12 months     Recent Labs   Lab Test 10/26/20  0653                ACE Inhibitors (Including Combos) Protocol Passed - 11/7/2020 12:17 PM        Passed - Blood pressure under 140/90 in past 12 months     BP Readings from Last 3 Encounters:   09/10/20 112/70   01/16/20 110/70   09/20/19 128/84                 Passed - Recent (12 mo) or future (30 days) visit within the authorizing provider's specialty     Patient has had an office visit with the authorizing provider or a provider within the authorizing providers department within the previous 12 mos or has a future within next 30 days. See \"Patient Info\" tab in inbasket, or \"Choose Columns\" in Meds & Orders " section of the refill encounter.              Passed - Medication is active on med list        Passed - Patient is age 18 or older        Passed - Normal serum creatinine on file in past 12 months     Recent Labs   Lab Test 10/26/20  0653   CR 1.06       Ok to refill medication if creatinine is low          Passed - Normal serum potassium on file in past 12 months     Recent Labs   Lab Test 10/26/20  0653   POTASSIUM 4.2

## 2020-11-10 RX ORDER — LISINOPRIL/HYDROCHLOROTHIAZIDE 10-12.5 MG
TABLET ORAL
Qty: 30 TABLET | Refills: 0 | Status: SHIPPED | OUTPATIENT
Start: 2020-11-10 | End: 2020-12-07

## 2020-11-10 NOTE — TELEPHONE ENCOUNTER
Prescription approved per [facility] Refill Protocol.    Delia OLVERA RN, BSN

## 2020-11-16 ENCOUNTER — HEALTH MAINTENANCE LETTER (OUTPATIENT)
Age: 55
End: 2020-11-16

## 2020-12-07 DIAGNOSIS — I10 HYPERTENSION GOAL BP (BLOOD PRESSURE) < 140/90: ICD-10-CM

## 2020-12-07 NOTE — TELEPHONE ENCOUNTER
"Requested Prescriptions   Pending Prescriptions Disp Refills     lisinopril-hydrochlorothiazide (ZESTORETIC) 10-12.5 MG tablet 30 tablet 0     Sig: Take 1 tablet by mouth daily       Diuretics (Including Combos) Protocol Passed - 12/7/2020  8:43 AM        Passed - Blood pressure under 140/90 in past 12 months     BP Readings from Last 3 Encounters:   09/10/20 112/70   01/16/20 110/70   09/20/19 128/84                 Passed - Recent (12 mo) or future (30 days) visit within the authorizing provider's specialty     Patient has had an office visit with the authorizing provider or a provider within the authorizing providers department within the previous 12 mos or has a future within next 30 days. See \"Patient Info\" tab in inbasket, or \"Choose Columns\" in Meds & Orders section of the refill encounter.              Passed - Medication is active on med list        Passed - Patient is age 18 or older        Passed - Normal serum creatinine on file in past 12 months     Recent Labs   Lab Test 10/26/20  0653   CR 1.06              Passed - Normal serum potassium on file in past 12 months     Recent Labs   Lab Test 10/26/20  0653   POTASSIUM 4.2                    Passed - Normal serum sodium on file in past 12 months     Recent Labs   Lab Test 10/26/20  0653                ACE Inhibitors (Including Combos) Protocol Passed - 12/7/2020  8:43 AM        Passed - Blood pressure under 140/90 in past 12 months     BP Readings from Last 3 Encounters:   09/10/20 112/70   01/16/20 110/70   09/20/19 128/84                 Passed - Recent (12 mo) or future (30 days) visit within the authorizing provider's specialty     Patient has had an office visit with the authorizing provider or a provider within the authorizing providers department within the previous 12 mos or has a future within next 30 days. See \"Patient Info\" tab in inbasket, or \"Choose Columns\" in Meds & Orders section of the refill encounter.              Passed - " Medication is active on med list        Passed - Patient is age 18 or older        Passed - Normal serum creatinine on file in past 12 months     Recent Labs   Lab Test 10/26/20  0653   CR 1.06       Ok to refill medication if creatinine is low          Passed - Normal serum potassium on file in past 12 months     Recent Labs   Lab Test 10/26/20  0653   POTASSIUM 4.2

## 2020-12-08 RX ORDER — LISINOPRIL/HYDROCHLOROTHIAZIDE 10-12.5 MG
1 TABLET ORAL DAILY
Qty: 90 TABLET | Refills: 1 | Status: SHIPPED | OUTPATIENT
Start: 2020-12-08 | End: 2021-06-04

## 2021-04-16 ENCOUNTER — IMMUNIZATION (OUTPATIENT)
Dept: NURSING | Facility: CLINIC | Age: 56
End: 2021-04-16
Payer: COMMERCIAL

## 2021-04-16 PROCEDURE — 0001A PR COVID VAC PFIZER DIL RECON 30 MCG/0.3 ML IM: CPT

## 2021-04-16 PROCEDURE — 91300 PR COVID VAC PFIZER DIL RECON 30 MCG/0.3 ML IM: CPT

## 2021-05-07 ENCOUNTER — IMMUNIZATION (OUTPATIENT)
Dept: NURSING | Facility: CLINIC | Age: 56
End: 2021-05-07
Attending: INTERNAL MEDICINE
Payer: COMMERCIAL

## 2021-05-07 PROCEDURE — 91300 PR COVID VAC PFIZER DIL RECON 30 MCG/0.3 ML IM: CPT

## 2021-05-07 PROCEDURE — 0002A PR COVID VAC PFIZER DIL RECON 30 MCG/0.3 ML IM: CPT

## 2021-06-04 DIAGNOSIS — I10 HYPERTENSION GOAL BP (BLOOD PRESSURE) < 140/90: ICD-10-CM

## 2021-06-04 RX ORDER — LISINOPRIL/HYDROCHLOROTHIAZIDE 10-12.5 MG
TABLET ORAL
Qty: 90 TABLET | Refills: 0 | Status: SHIPPED | OUTPATIENT
Start: 2021-06-04 | End: 2021-09-03

## 2021-09-02 ENCOUNTER — TELEPHONE (OUTPATIENT)
Dept: FAMILY MEDICINE | Facility: CLINIC | Age: 56
End: 2021-09-02

## 2021-09-02 NOTE — TELEPHONE ENCOUNTER
Contacted patient because patient was scheduled for physical along with med checks, and skin lesion removal.  We are unable to address all of these in the 20 minute appt. Tomorrow.  Would like to set up an additional appt. For patient.

## 2021-09-03 ENCOUNTER — OFFICE VISIT (OUTPATIENT)
Dept: FAMILY MEDICINE | Facility: CLINIC | Age: 56
End: 2021-09-03
Payer: COMMERCIAL

## 2021-09-03 VITALS
SYSTOLIC BLOOD PRESSURE: 124 MMHG | OXYGEN SATURATION: 96 % | RESPIRATION RATE: 16 BRPM | WEIGHT: 235.8 LBS | HEART RATE: 80 BPM | BODY MASS INDEX: 31.94 KG/M2 | DIASTOLIC BLOOD PRESSURE: 88 MMHG | HEIGHT: 72 IN | TEMPERATURE: 98.5 F

## 2021-09-03 DIAGNOSIS — I10 HYPERTENSION GOAL BP (BLOOD PRESSURE) < 140/90: ICD-10-CM

## 2021-09-03 DIAGNOSIS — Z00.00 WELL ADULT EXAM: Primary | ICD-10-CM

## 2021-09-03 DIAGNOSIS — E78.5 HYPERLIPIDEMIA LDL GOAL <130: ICD-10-CM

## 2021-09-03 DIAGNOSIS — L91.8 SKIN TAG: ICD-10-CM

## 2021-09-03 DIAGNOSIS — I89.0 LYMPHEDEMA: ICD-10-CM

## 2021-09-03 LAB
ANION GAP SERPL CALCULATED.3IONS-SCNC: 3 MMOL/L (ref 3–14)
BUN SERPL-MCNC: 21 MG/DL (ref 7–30)
CALCIUM SERPL-MCNC: 8 MG/DL (ref 8.5–10.1)
CHLORIDE BLD-SCNC: 108 MMOL/L (ref 94–109)
CHOLEST SERPL-MCNC: 187 MG/DL
CO2 SERPL-SCNC: 28 MMOL/L (ref 20–32)
CREAT SERPL-MCNC: 1.04 MG/DL (ref 0.66–1.25)
FASTING STATUS PATIENT QL REPORTED: YES
GFR SERPL CREATININE-BSD FRML MDRD: 79 ML/MIN/1.73M2
GLUCOSE BLD-MCNC: 94 MG/DL (ref 70–99)
HDLC SERPL-MCNC: 46 MG/DL
LDLC SERPL CALC-MCNC: 126 MG/DL
NONHDLC SERPL-MCNC: 141 MG/DL
POTASSIUM BLD-SCNC: 4.1 MMOL/L (ref 3.4–5.3)
SODIUM SERPL-SCNC: 139 MMOL/L (ref 133–144)
TRIGL SERPL-MCNC: 75 MG/DL

## 2021-09-03 PROCEDURE — 36415 COLL VENOUS BLD VENIPUNCTURE: CPT | Performed by: FAMILY MEDICINE

## 2021-09-03 PROCEDURE — 99396 PREV VISIT EST AGE 40-64: CPT | Mod: 25 | Performed by: FAMILY MEDICINE

## 2021-09-03 PROCEDURE — 80048 BASIC METABOLIC PNL TOTAL CA: CPT | Performed by: FAMILY MEDICINE

## 2021-09-03 PROCEDURE — 80061 LIPID PANEL: CPT | Performed by: FAMILY MEDICINE

## 2021-09-03 PROCEDURE — 11200 RMVL SKIN TAGS UP TO&INC 15: CPT | Performed by: FAMILY MEDICINE

## 2021-09-03 RX ORDER — LISINOPRIL/HYDROCHLOROTHIAZIDE 10-12.5 MG
1 TABLET ORAL DAILY
Qty: 90 TABLET | Refills: 4 | Status: SHIPPED | OUTPATIENT
Start: 2021-09-03 | End: 2022-11-25

## 2021-09-03 ASSESSMENT — ENCOUNTER SYMPTOMS
DIZZINESS: 0
CHILLS: 0
MYALGIAS: 0
NERVOUS/ANXIOUS: 0
HEADACHES: 0
FEVER: 0
EYE PAIN: 0
PALPITATIONS: 0
HEMATOCHEZIA: 0
FREQUENCY: 0
NAUSEA: 0
HEMATURIA: 0
COUGH: 0
DIARRHEA: 0
CONSTIPATION: 0
PARESTHESIAS: 0
DYSURIA: 0
ABDOMINAL PAIN: 0
SHORTNESS OF BREATH: 0
HEARTBURN: 0
JOINT SWELLING: 0
SORE THROAT: 0
WEAKNESS: 0
ARTHRALGIAS: 0

## 2021-09-03 ASSESSMENT — MIFFLIN-ST. JEOR: SCORE: 1924.64

## 2021-09-03 NOTE — PROGRESS NOTES
SUBJECTIVE:   CC: Juma Spivey is an 56 year old male who presents for preventative health visit.       Patient has been advised of split billing requirements and indicates understanding: Yes  Healthy Habits:     Getting at least 3 servings of Calcium per day:  Yes    Bi-annual eye exam:  NO    Dental care twice a year:  Yes    Sleep apnea or symptoms of sleep apnea:  None    Diet:  Regular (no restrictions)    Frequency of exercise:  2-3 days/week    Duration of exercise:  15-30 minutes    Taking medications regularly:  Yes    Medication side effects:  None    PHQ-2 Total Score: 0    Additional concerns today:  No          Hypertension Follow-up      Do you check your blood pressure regularly outside of the clinic? Yes     Are you following a low salt diet? Yes    Are your blood pressures ever more than 140 on the top number (systolic) OR more   than 90 on the bottom number (diastolic), for example 140/90? No      Today's PHQ-2 Score:   PHQ-2 ( 1999 [employer]) 9/3/2021   Q1: Little interest or pleasure in doing things 0   Q2: Feeling down, depressed or hopeless 0   PHQ-2 Score 0   Q1: Little interest or pleasure in doing things Not at all   Q2: Feeling down, depressed or hopeless Not at all   PHQ-2 Score 0       Abuse: Current or Past(Physical, Sexual or Emotional)- No  Do you feel safe in your environment? Yes        Social History     Tobacco Use     Smoking status: Never Smoker     Smokeless tobacco: Never Used   Substance Use Topics     Alcohol use: Yes     Comment: occ.         Alcohol Use 9/3/2021   Prescreen: >3 drinks/day or >7 drinks/week? No   Prescreen: >3 drinks/day or >7 drinks/week? -       Last PSA: No results found for: PSA    Reviewed orders with patient. Reviewed health maintenance and updated orders accordingly - Yes  Labs reviewed in EPIC  Patient Active Problem List   Diagnosis     Lymphedema     Hypertension goal BP (blood pressure) < 140/90     Hyperlipidemia LDL goal <130     Overweight      Microscopic hematuria     Colon polyp     Advanced directives, counseling/discussion     History reviewed. No pertinent surgical history.    Social History     Tobacco Use     Smoking status: Never Smoker     Smokeless tobacco: Never Used   Substance Use Topics     Alcohol use: Yes     Comment: occ.     Family History   Problem Relation Age of Onset     Hypertension Mother      Neurologic Disorder Mother         brain bleeds     Alcohol/Drug Father      Breast Cancer Sister          Current Outpatient Medications   Medication Sig Dispense Refill     lisinopril-hydrochlorothiazide (ZESTORETIC) 10-12.5 MG tablet Take 1 tablet by mouth daily 90 tablet 4     Allergies   Allergen Reactions     Penicillins      Thinks he may be allergic because his father was       Reviewed and updated as needed this visit by clinical staff  Tobacco  Allergies  Meds  Problems  Med Hx  Surg Hx  Fam Hx  Soc Hx          Reviewed and updated as needed this visit by Provider  Tobacco  Allergies  Meds  Problems  Med Hx  Surg Hx  Fam Hx         Past Medical History:   Diagnosis Date     Other lymphedema       History reviewed. No pertinent surgical history.    Review of Systems   Constitutional: Negative for chills and fever.   HENT: Negative for congestion, ear pain, hearing loss and sore throat.    Eyes: Negative for pain and visual disturbance.   Respiratory: Negative for cough and shortness of breath.    Cardiovascular: Negative for chest pain, palpitations and peripheral edema.   Gastrointestinal: Negative for abdominal pain, constipation, diarrhea, heartburn, hematochezia and nausea.   Genitourinary: Negative for discharge, dysuria, frequency, genital sores, hematuria, impotence and urgency.   Musculoskeletal: Negative for arthralgias, joint swelling and myalgias.   Skin: Negative for rash.   Neurological: Negative for dizziness, weakness, headaches and paresthesias.   Psychiatric/Behavioral: Negative for mood changes.  "The patient is not nervous/anxious.          OBJECTIVE:   /88   Pulse 80   Temp 98.5  F (36.9  C) (Tympanic)   Resp 16   Ht 1.816 m (5' 11.5\")   Wt 107 kg (235 lb 12.8 oz)   SpO2 96%   BMI 32.43 kg/m      Physical Exam  GENERAL: healthy, alert and no distress  EYES: Eyes grossly normal to inspection, PERRL and conjunctivae and sclerae normal  HENT: normal cephalic/atraumatic and ear canals and TM's normal  NECK: no adenopathy, no asymmetry, masses, or scars and thyroid normal to palpation  RESP: lungs clear to auscultation - no rales, rhonchi or wheezes  CV: regular rate and rhythm, normal S1 S2, no S3 or S4, no murmur, click or rub, no peripheral edema and peripheral pulses strong  ABDOMEN: soft, nontender, no hepatosplenomegaly, no masses and bowel sounds normal  MS: no gross musculoskeletal defects noted, 1+ bilateral pedal edema  SKIN: one skin tag right lateral neck. Treated with LN2 x 3 FT cycles.  NEURO: Normal strength and tone, mentation intact and speech normal  PSYCH: mentation appears normal, affect normal/bright    Diagnostic Test Results:  Labs reviewed in Epic    ASSESSMENT/PLAN:   Well adult exam    Hypertension goal BP (blood pressure) < 140/90  Well controlled. Refilled medication. Check labs.    - lisinopril-hydrochlorothiazide (ZESTORETIC) 10-12.5 MG tablet; Take 1 tablet by mouth daily  - Basic metabolic panel; Future  - Basic metabolic panel    Hyperlipidemia LDL goal <130  Check labs.   - Lipid panel reflex to direct LDL Fasting; Future    Lymphedema  Stable. Refilled stocking order.   - Orthotics, Prosthetics and Custom Compression Order    Skin tag  Cry x 3 FT cycles.   - REMOVAL OF SKIN TAGS, FIRST 15        Patient has been advised of split billing requirements and indicates understanding: Yes  COUNSELING:   Reviewed preventive health counseling, as reflected in patient instructions    Estimated body mass index is 32.43 kg/m  as calculated from the following:    Height as of " "this encounter: 1.816 m (5' 11.5\").    Weight as of this encounter: 107 kg (235 lb 12.8 oz).     Weight management plan: See AVS    He reports that he has never smoked. He has never used smokeless tobacco.      Counseling Resources:  ATP IV Guidelines  Pooled Cohorts Equation Calculator  FRAX Risk Assessment  ICSI Preventive Guidelines  Dietary Guidelines for Americans, 2010  USDA's MyPlate  ASA Prophylaxis  Lung CA Screening    Adri Harry MD  [facility]  "

## 2021-09-03 NOTE — PATIENT INSTRUCTIONS
Your BMI is Body mass index is Body mass index is 32.43 kg/m .   Shingrix is the new shingles vaccine. It is typically a pharmacy benefit under most insurances. The [facility] pharmacy can check your insurance and give it if it's covered and you don't need an appointment to do this.     A BMI of 18.5 to 24.9 is in the healthy range. A person with a BMI of 25 to 29.9 is considered overweight, and someone with a BMI of 30 or greater is considered obese. More than two-thirds of American adults are considered overweight or obese.  Weight management is a personal decision.  If you are interested in exploring weight loss strategies, the following discussion covers the approaches that may be successful. Body mass index (BMI) is one way to tell whether you are at a healthy weight, overweight, or obese. It measures your weight in relation to your height.  Being overweight or obese increases the risk for further weight gain. Excess weight may lead to heart disease and diabetes.  Creating and following plans for healthy eating and physical activity may help you improve your health.  Weight control is part of healthy lifestyle and includes exercise, emotional health, and healthy eating habits. Careful eating habits lifelong are the mainstay of weight control. Though there are significant health benefits from weight loss, long-term weight loss with diet alone may be very difficult to achieve- studies show long-term success with dietary management alone in less than 10% of people. Attaining a healthy weight may be especially difficult to achieve in those with severe obesity. In some cases, medications, devices and surgical management might be considered.  What can you do?    Keep a food journal to help with mindful eating and finding ways to modify your diet.      Reduce the amount of processed food in your diet. Focus on adding vegetables, and lean proteins.    Reduce dietary carbohydrates. Limiting to  gm of  carbohydrates per day has been shows to help boost weight loss.  If you have diabetes or are on diabetic medications do not do this without talking to your physician or healthcare provider.    Diet combined with exercise helps maintain muscle while optimizing fat loss. Strength training is particularly important for building and maintaining muscle mass. Exercise helps reduce stress, increase energy, and improves fitness. Increasing exercise without diet control, however, may not burn enough calories to loose weight.         Start walking three days a week 10-20 minutes at a time    Work towards walking thirty minutes five days a week      Eventually, increase the speed of your walking for 1-2 minutes at time    In addition, we recommend that you review healthy lifestyles and methods for weight loss available through the National Institutes of Health patient information sites:  http://win.niddk.nih.gov/publications/index.htm    Also look into health and wellness programs that may be available through your health insurance provider, employer, local community center, or lani club.

## 2021-09-03 NOTE — PROGRESS NOTES
DME (Durable Medical Equipment) Orders and Documentation  Orders Placed This Encounter   Procedures     Orthotics, Prosthetics and Custom Compression Order      The patient was assessed and it was determined the patient is in need of the following listed DME Supplies/Equipment. Please complete supporting documentation below to demonstrate medical necessity.      DME All Other Item(s) Documentation    List reason for need and supporting documentation for medical necessity below for each DME item.     1. Lymphedema

## 2021-09-03 NOTE — NURSING NOTE
"Initial /88   Pulse 80   Temp 98.5  F (36.9  C) (Tympanic)   Resp 16   Ht 1.816 m (5' 11.5\")   Wt 107 kg (235 lb 12.8 oz)   SpO2 96%   BMI 32.43 kg/m   Estimated body mass index is 32.43 kg/m  as calculated from the following:    Height as of this encounter: 1.816 m (5' 11.5\").    Weight as of this encounter: 107 kg (235 lb 12.8 oz). .      "

## 2021-09-18 ENCOUNTER — HEALTH MAINTENANCE LETTER (OUTPATIENT)
Age: 56
End: 2021-09-18

## 2021-11-18 ENCOUNTER — OFFICE VISIT (OUTPATIENT)
Dept: FAMILY MEDICINE | Facility: CLINIC | Age: 56
End: 2021-11-18
Payer: COMMERCIAL

## 2021-11-18 VITALS
OXYGEN SATURATION: 97 % | SYSTOLIC BLOOD PRESSURE: 124 MMHG | TEMPERATURE: 97.2 F | HEART RATE: 79 BPM | WEIGHT: 243 LBS | DIASTOLIC BLOOD PRESSURE: 72 MMHG | BODY MASS INDEX: 33.42 KG/M2 | RESPIRATION RATE: 16 BRPM

## 2021-11-18 DIAGNOSIS — H81.10 BENIGN PAROXYSMAL POSITIONAL VERTIGO, UNSPECIFIED LATERALITY: Primary | ICD-10-CM

## 2021-11-18 PROCEDURE — 99213 OFFICE O/P EST LOW 20 MIN: CPT | Performed by: NURSE PRACTITIONER

## 2021-11-18 RX ORDER — MECLIZINE HYDROCHLORIDE 25 MG/1
25 TABLET ORAL 3 TIMES DAILY PRN
Qty: 20 TABLET | Refills: 0 | Status: SHIPPED | OUTPATIENT
Start: 2021-11-18 | End: 2023-03-31

## 2021-11-18 RX ORDER — NAPROXEN SODIUM 220 MG
220 TABLET ORAL 2 TIMES DAILY WITH MEALS
COMMUNITY

## 2021-11-18 ASSESSMENT — ENCOUNTER SYMPTOMS
NAUSEA: 1
RESPIRATORY NEGATIVE: 1
CONSTITUTIONAL NEGATIVE: 1
DIZZINESS: 1
CARDIOVASCULAR NEGATIVE: 1
EYES NEGATIVE: 1

## 2021-11-18 NOTE — PROGRESS NOTES
"Assessment & Plan     Benign paroxysmal positional vertigo, unspecified laterality  Patient's symptoms is significant for vertigo.  Unable to reproduce symptoms during physical examination.  Non-focal neurological examination.  Discussed signs and symptoms of benign paroxysmal positional vertigo.  Recommend follow up in one week if symptoms does not improve.    - meclizine (ANTIVERT) 25 MG tablet; Take 1 tablet (25 mg) by mouth 3 times daily as needed for dizziness      [id number]}     Patient Instructions     I believe vertigo is the cause of your symptoms.  To help with the symptoms, we need to help your body shift the crystals of the inner ear back to the proper position.  To do this, you need to try an exercise at home.  There is a maneuver called the \"half somersault\" that can be very effective in relieving the symptoms of vertigo.  The link for a video on how to perform this is below.  It may take several repetitions of of the maneuver.  https://Jenn Rykert.be/[email or web address] or go to YouTube and search Domenica Acosta MD Vertigo treatment.  I have prescribed medication to help with the symptoms as well.  You may take this every 8 hours as needed, however it is not a replacement for trying the maneuvers to move the crystals back to the appropriate location in your inner ear.  Please follow up with your primary care provide in one week for a recheck of your symptoms.  If you develop severe worsening of the symptoms, fevers, weakness, vomiting not controlled by medication, or any other concerns, please be seen in the ER right away.    Benign Paroxysmal Positional Vertigo     Your health care provider may move your head in certain ways to treat your BPPV.     Benign paroxysmal positional vertigo (BPPV) is a problem with the inner ear. The inner ear contains the vestibular system. This system is what helps you keep your balance. BPPV causes a feeling of spinning. It is a common problem of the vestibular system.  Understanding the " vestibular system  The vestibular system of the ear is made up of very tiny parts. They include the utricle, saccule, and semicircular canals. The utricle is a tiny organ that contains calcium crystals. In some people, the crystals can move into the semicircular canals. When this happens, the system no longer works as it should. This causes BPPV. Benign means it is not life threatening. Paroxysmal means it happens suddenly. Positional means that it happens when you move your head. Vertigo is a feeling of spinning.  What causes BPPV?  Causes include injury to your head or neck. Other problems with the vestibular system may cause BPPV. In many people, the cause of BPPV is not known.  Symptoms of BPPV  You many have repeated feelings of spinning (vertigo). The vertigo usually lasts less than 1 minute. Some movements, such as rolling over in bed, can bring on vertigo.  Diagnosing BPPV  Your primary healthcare provider may diagnose and treat your BPPV. Or you may see an ear, nose, and throat doctor (otolaryngologist). In some cases, you may see a nervous system doctor (neurologist).  The healthcare provider will ask about your symptoms and your medical history. He or she will examine you. You may have hearing and balance tests. As part of the exam, your healthcare provider may have you move your head and body in certain ways. If you have BPPV, the movements can bring on vertigo. Your provider will also look for abnormal movements of your eyes. You may have other tests to check your vestibular or nervous systems.  Treatment for BPPV  Your healthcare provider may try to move the calcium crystals. This is done by having you move your head and neck in certain ways. This treatment is safe and often works well. You may also be told to do these movements at home. You may still have vertigo for a few weeks. Your healthcare provider will recheck your symptoms, usually in about a month. Special physical therapy may also be part of  treatment. In rare cases, surgery may be needed for BPPV that does not go away.     When to call the healthcare provider  Call your healthcare provider right away if you have any of these:    Symptoms that do not go away with treatment    Symptoms that get worse    New symptoms   Date Last Reviewed: 5/1/2017        Return in about 1 week (around 11/25/2021) for worsening or continued symptoms.    Gillian Garcia, [facility]    Subjective      HPI  Carlos is a 56 year old who presents for complaint of dizziness since yesterday. He has dizziness when getting out of bed in morning, bending to tie shoe laces and turing head side to side.  Nausea accompanies dizziness, and feels like the room is spinning.     Flu shot: Declined  Shingles: Check insurance first  [employer] - 3rd dose - need consent     Dizziness  Onset/Duration: Yesterday morning - drinking the same amount as usual.   Description:   Do you feel faint: no  Does it feel like the surroundings (bed, room) are moving: YES- sometimes when this happen has nauseas too   Unsteady/off balance: YES- sometimes (worst in the mornings)  Have you passed out or fallen: no  Intensity: mild  Progression of Symptoms: worsening and intermittent  Accompanying Signs & Symptoms:  Heart palpitations or chest pain: no  Nausea, vomiting: YES- Nauseas when is bad  Weakness or lack of coordination in arms or legs: no  Vision or speech changes: no  Numbness or tingling: no  Ringing in ears (Tinnitus): no  Hearing Loss: no  History:   Head trauma/concussion history: no  Previous similar symptoms: no  Recent bleeding history: no  Any new medications (BP?): no  Precipitating factors:   Worse with activity: no  Worse with head movement: YES  Alleviating factors:   Does staying in a fixed position give relief: YES  Therapies tried and outcome: None        Review of Systems   Constitutional: Negative.    Eyes: Negative.    Respiratory: Negative.    Cardiovascular:  Negative.    Gastrointestinal: Positive for nausea.   Neurological: Positive for dizziness.        Objective    /72   Pulse 79   Temp 97.2  F (36.2  C) (Tympanic)   Resp 16   Wt 110.2 kg (243 lb)   SpO2 97%   BMI 33.42 kg/m    Body mass index is 33.42 kg/m .  Physical Exam  Constitutional:       Appearance: Normal appearance.   HENT:      Mouth/Throat:      Mouth: Mucous membranes are moist.   Eyes:      Extraocular Movements: Extraocular movements intact.      Right eye: No nystagmus.      Left eye: No nystagmus.      Conjunctiva/sclera: Conjunctivae normal.      Pupils: Pupils are equal, round, and reactive to light.   Cardiovascular:      Rate and Rhythm: Normal rate and regular rhythm.      Pulses: Normal pulses.      Heart sounds: Normal heart sounds.   Pulmonary:      Effort: Pulmonary effort is normal.      Breath sounds: Normal breath sounds.   Neurological:      Mental Status: He is alert.      Comments: Cranial nerve 3-12 intact. Normal finger to nose, pronation drift, heel to shin, rapid alternating movement, and  strength.  Negative for Saxis Hallpike.

## 2021-11-18 NOTE — PATIENT INSTRUCTIONS
"I believe vertigo is the cause of your symptoms.  To help with the symptoms, we need to help your body shift the crystals of the inner ear back to the proper position.  To do this, you need to try an exercise at home.  There is a maneuver called the \"half somersault\" that can be very effective in relieving the symptoms of vertigo.  The link for a video on how to perform this is below.  It may take several repetitions of of the maneuver.  https://Watchup.Apofore/[email or web address] or go to YouTube and search Domenica Acosta MD Vertigo treatment.  I have prescribed medication to help with the symptoms as well.  You may take this every 8 hours as needed, however it is not a replacement for trying the maneuvers to move the crystals back to the appropriate location in your inner ear.  Please follow up with your primary care provide in one week for a recheck of your symptoms.  If you develop severe worsening of the symptoms, fevers, weakness, vomiting not controlled by medication, or any other concerns, please be seen in the ER right away.    Benign Paroxysmal Positional Vertigo     Your health care provider may move your head in certain ways to treat your BPPV.     Benign paroxysmal positional vertigo (BPPV) is a problem with the inner ear. The inner ear contains the vestibular system. This system is what helps you keep your balance. BPPV causes a feeling of spinning. It is a common problem of the vestibular system.  Understanding the vestibular system  The vestibular system of the ear is made up of very tiny parts. They include the utricle, saccule, and semicircular canals. The utricle is a tiny organ that contains calcium crystals. In some people, the crystals can move into the semicircular canals. When this happens, the system no longer works as it should. This causes BPPV. Benign means it is not life threatening. Paroxysmal means it happens suddenly. Positional means that it happens when you move your head. Vertigo is a feeling of " spinning.  What causes BPPV?  Causes include injury to your head or neck. Other problems with the vestibular system may cause BPPV. In many people, the cause of BPPV is not known.  Symptoms of BPPV  You many have repeated feelings of spinning (vertigo). The vertigo usually lasts less than 1 minute. Some movements, such as rolling over in bed, can bring on vertigo.  Diagnosing BPPV  Your primary healthcare provider may diagnose and treat your BPPV. Or you may see an ear, nose, and throat doctor (otolaryngologist). In some cases, you may see a nervous system doctor (neurologist).  The healthcare provider will ask about your symptoms and your medical history. He or she will examine you. You may have hearing and balance tests. As part of the exam, your healthcare provider may have you move your head and body in certain ways. If you have BPPV, the movements can bring on vertigo. Your provider will also look for abnormal movements of your eyes. You may have other tests to check your vestibular or nervous systems.  Treatment for BPPV  Your healthcare provider may try to move the calcium crystals. This is done by having you move your head and neck in certain ways. This treatment is safe and often works well. You may also be told to do these movements at home. You may still have vertigo for a few weeks. Your healthcare provider will recheck your symptoms, usually in about a month. Special physical therapy may also be part of treatment. In rare cases, surgery may be needed for BPPV that does not go away.     When to call the healthcare provider  Call your healthcare provider right away if you have any of these:    Symptoms that do not go away with treatment    Symptoms that get worse    New symptoms   Date Last Reviewed: 5/1/2017

## 2022-02-13 ENCOUNTER — LAB (OUTPATIENT)
Dept: FAMILY MEDICINE | Facility: CLINIC | Age: 57
End: 2022-02-13
Attending: FAMILY MEDICINE
Payer: COMMERCIAL

## 2022-02-13 DIAGNOSIS — Z20.822 SUSPECTED 2019 NOVEL CORONAVIRUS INFECTION: ICD-10-CM

## 2022-02-13 PROCEDURE — U0005 INFEC AGEN DETEC AMPLI PROBE: HCPCS

## 2022-02-13 PROCEDURE — U0003 INFECTIOUS AGENT DETECTION BY NUCLEIC ACID (DNA OR RNA); SEVERE ACUTE RESPIRATORY SYNDROME CORONAVIRUS 2 (SARS-COV-2) (CORONAVIRUS DISEASE [COVID-19]), AMPLIFIED PROBE TECHNIQUE, MAKING USE OF HIGH THROUGHPUT TECHNOLOGIES AS DESCRIBED BY CMS-2020-01-R: HCPCS

## 2022-02-13 PROCEDURE — 99207 PR NO CHARGE LOS: CPT

## 2022-02-14 LAB — SARS-COV-2 RNA RESP QL NAA+PROBE: NEGATIVE

## 2022-11-19 ENCOUNTER — HEALTH MAINTENANCE LETTER (OUTPATIENT)
Age: 57
End: 2022-11-19

## 2022-11-24 ENCOUNTER — TELEPHONE (OUTPATIENT)
Dept: FAMILY MEDICINE | Facility: CLINIC | Age: 57
End: 2022-11-24

## 2022-11-24 DIAGNOSIS — I10 HYPERTENSION GOAL BP (BLOOD PRESSURE) < 140/90: ICD-10-CM

## 2022-11-24 NOTE — LETTER
[facility]  [address]  [facility] [address]  [phone]  November 28, 2022    Juma Spivey  [address] [place] [address]    Dear Juma,    We care about your health and have reviewed your health plan including your medical conditions, medication list, and lab results.  Based on this review, it is recommended that you follow up regarding the following health topic(s):     -Wellness (Physical) Visit - Appt needed for ANY medication refills     Please call us at [phone] (or use [employer]) to address the above recommendations.     Thank you for trusting [facility] and we appreciate the opportunity to serve you.  We look forward to supporting your healthcare needs in the future.    Healthy Regards,      Your Health Care Team  [facility]

## 2022-11-25 RX ORDER — LISINOPRIL/HYDROCHLOROTHIAZIDE 10-12.5 MG
TABLET ORAL
Qty: 90 TABLET | Refills: 0 | Status: SHIPPED | OUTPATIENT
Start: 2022-11-25 | End: 2023-03-31

## 2022-11-25 NOTE — TELEPHONE ENCOUNTER
"Routing refill request to provider for review/approval because:  Labs not current:  cr  Blood pressure    Pending Prescriptions:                       Disp   Refills    lisinopril-hydrochlorothiazide (ZESTORETIC*90 tab*4        Sig: TAKE 1 TABLET BY MOUTH EVERY DAY    Requested Prescriptions   Pending Prescriptions Disp Refills    lisinopril-hydrochlorothiazide (ZESTORETIC) 10-12.5 MG tablet [Pharmacy Med Name: LISINOPRIL-HCTZ 10-12.5 MG TAB] 90 tablet 4     Sig: TAKE 1 TABLET BY MOUTH EVERY DAY       Diuretics (Including Combos) Protocol Failed - 11/24/2022 12:36 AM        Failed - Blood pressure under 140/90 in past 12 months     BP Readings from Last 3 Encounters:   11/18/21 124/72   09/03/21 124/88   09/10/20 112/70                 Failed - Normal serum creatinine on file in past 12 months     Recent Labs   Lab Test 09/03/21  0730   CR 1.04              Failed - Normal serum potassium on file in past 12 months     Recent Labs   Lab Test 09/03/21  0730   POTASSIUM 4.1                    Failed - Normal serum sodium on file in past 12 months     Recent Labs   Lab Test 09/03/21  0730                 Passed - Recent (12 mo) or future (30 days) visit within the authorizing provider's specialty     Patient has had an office visit with the authorizing provider or a provider within the authorizing providers department within the previous 12 mos or has a future within next 30 days. See \"Patient Info\" tab in inbasket, or \"Choose Columns\" in Meds & Orders section of the refill encounter.              Passed - Medication is active on med list        Passed - Patient is age 18 or older       ACE Inhibitors (Including Combos) Protocol Failed - 11/24/2022 12:36 AM        Failed - Blood pressure under 140/90 in past 12 months     BP Readings from Last 3 Encounters:   11/18/21 124/72   09/03/21 124/88   09/10/20 112/70                 Failed - Normal serum creatinine on file in past 12 months     Recent Labs   Lab Test " "09/03/21  0730   CR 1.04       Ok to refill medication if creatinine is low          Failed - Normal serum potassium on file in past 12 months     Recent Labs   Lab Test 09/03/21  0730   POTASSIUM 4.1             Passed - Recent (12 mo) or future (30 days) visit within the authorizing provider's specialty     Patient has had an office visit with the authorizing provider or a provider within the authorizing providers department within the previous 12 mos or has a future within next 30 days. See \"Patient Info\" tab in inbasket, or \"Choose Columns\" in Meds & Orders section of the refill encounter.              Passed - Medication is active on med list        Passed - Patient is age 18 or older           Venita Hoang RN on 11/25/2022 at 2:44 PM    "

## 2022-11-25 NOTE — TELEPHONE ENCOUNTER
Due for face to face office visit for annual physical and med check. Refilled x 90 days. Further refills will be addressed at visit.

## 2022-12-29 ENCOUNTER — MYC MEDICAL ADVICE (OUTPATIENT)
Dept: FAMILY MEDICINE | Facility: CLINIC | Age: 57
End: 2022-12-29

## 2022-12-29 ENCOUNTER — TELEPHONE (OUTPATIENT)
Dept: DERMATOLOGY | Facility: CLINIC | Age: 57
End: 2022-12-29

## 2022-12-29 DIAGNOSIS — I89.0 LYMPHEDEMA: Primary | ICD-10-CM

## 2022-12-29 NOTE — TELEPHONE ENCOUNTER
[facility]    Phone Message    May a detailed message be left on voicemail: no     Reason for Call: Symptoms or Concerns     If patient has red-flag symptoms, warm transfer to triage line    Current symptom or concern: Pt's wife Rae states Pt has a growth on the side of his nose that bleeds whenever he washes his face.    Symptoms have been present for:  1-2 week(s)    Has patient previously been seen for this? No    By : NA    Date: NA    Are there any new or worsening symptoms? Yes: New, see above. Pt's wife declined first available visit in April stating Pt needs to be seen right away because it bleeds daily.       Action Taken: Other: [facility]    Travel Screening: Not Applicable

## 2022-12-29 NOTE — CONFIDENTIAL NOTE
Called wife- patient has a non healing spot on the side of his nose. Advised that this should be seen sooner- we will add him to the schedule for a spot only check- advised this would be a very quick appointment. Wife verbalized understanding    Thank you,    Christiane BAUMANNRN BSN  [facility]- [phone]

## 2023-01-11 ENCOUNTER — OFFICE VISIT (OUTPATIENT)
Dept: DERMATOLOGY | Facility: CLINIC | Age: 58
End: 2023-01-11
Payer: COMMERCIAL

## 2023-01-11 ENCOUNTER — TELEPHONE (OUTPATIENT)
Dept: DERMATOLOGY | Facility: CLINIC | Age: 58
End: 2023-01-11

## 2023-01-11 DIAGNOSIS — D23.9 DERMAL NEVUS: ICD-10-CM

## 2023-01-11 DIAGNOSIS — C44.01 BASAL CELL CARCINOMA, LIP: Primary | ICD-10-CM

## 2023-01-11 DIAGNOSIS — L82.1 SEBORRHEIC KERATOSIS: ICD-10-CM

## 2023-01-11 DIAGNOSIS — L81.4 LENTIGO: ICD-10-CM

## 2023-01-11 PROCEDURE — 13152 CMPLX RPR E/N/E/L 2.6-7.5 CM: CPT | Mod: 59 | Performed by: DERMATOLOGY

## 2023-01-11 PROCEDURE — 99243 OFF/OP CNSLTJ NEW/EST LOW 30: CPT | Mod: 25 | Performed by: DERMATOLOGY

## 2023-01-11 PROCEDURE — 17311 MOHS 1 STAGE H/N/HF/G: CPT | Performed by: DERMATOLOGY

## 2023-01-11 PROCEDURE — 40490 BIOPSY OF LIP: CPT | Performed by: DERMATOLOGY

## 2023-01-11 ASSESSMENT — PAIN SCALES - GENERAL: PAINLEVEL: NO PAIN (0)

## 2023-01-11 NOTE — LETTER
1/11/2023         RE: Juma Spivey  [address] [facility] [address]        Dear Colleague,    Thank you for referring your patient, Juma Spivey, to the [facility]. Please see a copy of my visit note below.    Juma Spivey , a 57 year old year old male patient, I was asked to see by Dr. Harry for non healing spot on right cheek.  Patient states this has been present for a while.  Patient reports the following symptoms:  Not healing .  Patient reports the following previous treatments cryo.  Patient reports the following modifying factors non.  Associated symptoms: none.  Patient has no other skin complaints today.  Remainder of the HPI, Meds, PMH, Allergies, FH, and SH was reviewed in chart.      Past Medical History:   Diagnosis Date     Other lymphedema        No past surgical history on file.     Family History   Problem Relation Age of Onset     Hypertension Mother      Neurologic Disorder Mother         brain bleeds     Alcohol/Drug Father      Breast Cancer Sister      Skin Cancer Sister        Social History     Socioeconomic History     Marital status:      Spouse name: Not on file     Number of children: Not on file     Years of education: Not on file     Highest education level: Not on file   Occupational History     Not on file   Tobacco Use     Smoking status: Never     Smokeless tobacco: Never   Substance and Sexual Activity     Alcohol use: Yes     Comment: occ.     Drug use: No     Sexual activity: Yes     Partners: Female   Other Topics Concern     Parent/sibling w/ CABG, MI or angioplasty before 65F 55M? No   Social History Narrative     Not on file     Social Determinants of Health     Financial Resource Strain: Not on file   Food Insecurity: Not on file   Transportation Needs: Not on file   Physical Activity: Not on file   Stress: Not on file   Social Connections: Not on file   Intimate Partner Violence: Not on file   Housing  Stability: Not on file       Outpatient Encounter Medications as of 1/11/2023   Medication Sig Dispense Refill     lisinopril-hydrochlorothiazide (ZESTORETIC) 10-12.5 MG tablet TAKE 1 TABLET BY MOUTH EVERY DAY 90 tablet 0     meclizine (ANTIVERT) 25 MG tablet Take 1 tablet (25 mg) by mouth 3 times daily as needed for dizziness 20 tablet 0     naproxen sodium (ALEVE) 220 MG tablet Take 220 mg by mouth 2 times daily (with meals)       No facility-administered encounter medications on file as of 1/11/2023.             Review Of Systems  Skin: As above  Eyes: negative  Ears/Nose/Throat: negative  Respiratory: No shortness of breath, dyspnea on exertion, cough, or hemoptysis  Cardiovascular: negative  Gastrointestinal: negative  Genitourinary: negative  Musculoskeletal: negative  Neurologic: negative  Psychiatric: negative  Hematologic/Lymphatic/Immunologic: negative  Endocrine: negative      O:   NAD, WDWN, Alert & Oriented, Mood & Affect wnl, Vitals stable   Here today with wife    General appearance ronald ii   Vitals stable   Alert, oriented and in no acute distress   R apical triangle 3mm pink pearly papule   Flesh colored papules on face  Stuck on papules and brown macules on face and ext       Eyes: Conjunctivae/lids:Normal     ENT: Lips, buccal mucosa, tongue: normal    MSK:Normal    Cardiovascular: peripheral edema none    Pulm: Breathing Normal    Neuro/Psych: Orientation:Normal; Mood/Affect:Normal      MICRO:   R apical triangle:Orthokeratosis of epidermis with a proliferation of nests of basaloid cells, with peripheral palisading and a haphazard arrangement in the center extending into the dermis, forming nodules.  The tumor cells have hyperchromatic nuclei. Poor cytoplasm and intercellular bridging.    A/P:  1. Seborrheic keratosis, lentigo, dermal nevus  2. R apical triangle r/o basal cell carcinoma   TANGENTIAL BIOPSY IN HOUSE:  After consent, anesthesia with LEC and prep, tangential excision performed and  dx above confirmed with frozen section histology.  No complications and routine wound care.  Patient is not on  anticoagulants and risk of bleeding discussed with patient.       I have personally reviewed all specimens and/or slides and used them with my medical judgement to determine or confirm the final diagnosis.     Patient told result basal cell carcinoma .    It was a pleasure speaking to Juma Spivey today.  Previous clinic  notes and pertinent laboratory tests were reviewed prior to Juma Spivey's visit.  Signs and Symptoms of skin cancer discussed with patient.  Patient encouraged to perform monthly skin exams.  UV precautions reviewed with patient.  Risks of non-melanoma skin cancer discussed with patient   Return to clinic 6 months  PROCEDURE NOTE  R apical triangle basal cell carcinoma   MOHS:   Location    The rationale for Mohs surgery was discussed with the patient and consent was obtained.  The risks and benefits as well as alternatives to therapy were discussed, in detail.  Specifically, the risks of infection, scarring, bleeding, prolonged wound healing, incomplete removal, allergy to anesthesia, nerve injury and recurrence were addressed.  Indication for Mohs was Location. Prior to the procedure, the treatment site was clearly identified and, if available, confirmed with previous photos and confirmed by the patient   All components of the Universal Protocol/PAUSE rule were completed.  The Mohs surgeon operated in two distinct and integrated capacities as the surgeon and pathologist.      The area was prepped with Betasept.  A rim of normal appearing skin was marked circumferentially around the lesion.  The area was infiltrated with local anesthesia.  The tumor was first debulked to remove all clinically apparent tumor.  An incision following the standard Mohs approach was done and the specimen was oriented,mapped and placed in 1 block(s).  Each specimen was then chromacoded and  processed in the Mohs laboratory using standard Mohs technique and submitted for frozen section histology.  Frozen section analysis showed no residual tumor but CLEAR MARGINS.      The tumor was excised using standard Mohs technique in 1 stages(s).  CLEAR MARGINS OBTAINED and Final defect size was 1 cm.     We discussed the options for wound management in full with the patient including risks/benefits/ possible outcomes.        REPAIR COMPLEX: Because of the tightness of the surrounding skin and Because of the size and full thickness nature of the defect, Because of the tightness of the surrounding skin, To maintain form and function, In order to avoid distortion and Because of the proximity to the nasal tip and ala, a complex closure was planned. After LE anesthesia and prep, Burow's triangles were excised in the relaxed skin tension lines. The wound edges were widely undermined greater than width of the defect on both sides by dissection in the subcutaneous plane until adequate tissue mobility was obtained. Hemostasis was obtained. The wound edges were closed in a layered fashion using Vicryl and Fast Absorbing Plain Gut sutures. Postoperative length was 3 cm.   EBL minimal; complications none; wound care routine.  The patient was discharged in good condition and will return in one week for wound evaluation.        Again, thank you for allowing me to participate in the care of your patient.        Sincerely,        Ian Sethi MD

## 2023-01-11 NOTE — TELEPHONE ENCOUNTER
[facility] Call [facility]    Phone Message    May a detailed message be left on voicemail: yes     Reason for Call: Other: Pt's wife Rae states the Follow-up nurse visit 1/12/23 was supposed to be scheduled for the following week Thursday instead. Please call Pt back to reschedule for next week, for a nurse only bandage change visit. Writer did not cancel the 1/12/23 visit in [facility]. Thank you.      Action Taken: Other: [facility] [facility]    Travel Screening: Not Applicable

## 2023-01-11 NOTE — TELEPHONE ENCOUNTER
Called patient back. Patient was scheduled for next Thursday 1/19/2023 instead. Patient was acceptable to this.  Dione Kasper LPN

## 2023-01-11 NOTE — NURSING NOTE
Chief Complaint   Patient presents with     Derm Problem     Spot on right side of nose       There were no vitals filed for this visit.  Wt Readings from Last 1 Encounters:   11/18/21 110.2 kg (243 lb)       Dione Kasper LPN .................1/11/2023

## 2023-01-11 NOTE — PROGRESS NOTES
Juma ROSS Patric , a 57 year old year old male patient, I was asked to see by Dr. Harry for non healing spot on right cheek.  Patient states this has been present for a while.  Patient reports the following symptoms:  Not healing .  Patient reports the following previous treatments cryo.  Patient reports the following modifying factors non.  Associated symptoms: none.  Patient has no other skin complaints today.  Remainder of the HPI, Meds, PMH, Allergies, FH, and SH was reviewed in chart.      Past Medical History:   Diagnosis Date     Other lymphedema        No past surgical history on file.     Family History   Problem Relation Age of Onset     Hypertension Mother      Neurologic Disorder Mother         brain bleeds     Alcohol/Drug Father      Breast Cancer Sister      Skin Cancer Sister        Social History     Socioeconomic History     Marital status:      Spouse name: Not on file     Number of children: Not on file     Years of education: Not on file     Highest education level: Not on file   Occupational History     Not on file   Tobacco Use     Smoking status: Never     Smokeless tobacco: Never   Substance and Sexual Activity     Alcohol use: Yes     Comment: occ.     Drug use: No     Sexual activity: Yes     Partners: Female   Other Topics Concern     Parent/sibling w/ CABG, MI or angioplasty before 65F 55M? No   Social History Narrative     Not on file     Social Determinants of Health     Financial Resource Strain: Not on file   Food Insecurity: Not on file   Transportation Needs: Not on file   Physical Activity: Not on file   Stress: Not on file   Social Connections: Not on file   Intimate Partner Violence: Not on file   Housing Stability: Not on file       Outpatient Encounter Medications as of 1/11/2023   Medication Sig Dispense Refill     lisinopril-hydrochlorothiazide (ZESTORETIC) 10-12.5 MG tablet TAKE 1 TABLET BY MOUTH EVERY DAY 90 tablet 0     meclizine (ANTIVERT) 25 MG tablet Take  1 tablet (25 mg) by mouth 3 times daily as needed for dizziness 20 tablet 0     naproxen sodium (ALEVE) 220 MG tablet Take 220 mg by mouth 2 times daily (with meals)       No facility-administered encounter medications on file as of 1/11/2023.             Review Of Systems  Skin: As above  Eyes: negative  Ears/Nose/Throat: negative  Respiratory: No shortness of breath, dyspnea on exertion, cough, or hemoptysis  Cardiovascular: negative  Gastrointestinal: negative  Genitourinary: negative  Musculoskeletal: negative  Neurologic: negative  Psychiatric: negative  Hematologic/Lymphatic/Immunologic: negative  Endocrine: negative      O:   NAD, WDWN, Alert & Oriented, Mood & Affect wnl, Vitals stable   Here today with wife    General appearance ronald ii   Vitals stable   Alert, oriented and in no acute distress   R apical triangle 3mm pink pearly papule   Flesh colored papules on face  Stuck on papules and brown macules on face and ext       Eyes: Conjunctivae/lids:Normal     ENT: Lips, buccal mucosa, tongue: normal    MSK:Normal    Cardiovascular: peripheral edema none    Pulm: Breathing Normal    Neuro/Psych: Orientation:Normal; Mood/Affect:Normal      MICRO:   R apical triangle:Orthokeratosis of epidermis with a proliferation of nests of basaloid cells, with peripheral palisading and a haphazard arrangement in the center extending into the dermis, forming nodules.  The tumor cells have hyperchromatic nuclei. Poor cytoplasm and intercellular bridging.    A/P:  1. Seborrheic keratosis, lentigo, dermal nevus  2. R apical triangle r/o basal cell carcinoma   TANGENTIAL BIOPSY IN HOUSE:  After consent, anesthesia with LEC and prep, tangential excision performed and dx above confirmed with frozen section histology.  No complications and routine wound care.  Patient is not on  anticoagulants and risk of bleeding discussed with patient.       I have personally reviewed all specimens and/or slides and used them with my medical  judgement to determine or confirm the final diagnosis.     Patient told result basal cell carcinoma .    It was a pleasure speaking to Juam ROSS Patric today.  Previous clinic  notes and pertinent laboratory tests were reviewed prior to Juma Spivey's visit.  Signs and Symptoms of skin cancer discussed with patient.  Patient encouraged to perform monthly skin exams.  UV precautions reviewed with patient.  Risks of non-melanoma skin cancer discussed with patient   Return to clinic 6 months  PROCEDURE NOTE  R apical triangle basal cell carcinoma   MOHS:   Location    The rationale for Mohs surgery was discussed with the patient and consent was obtained.  The risks and benefits as well as alternatives to therapy were discussed, in detail.  Specifically, the risks of infection, scarring, bleeding, prolonged wound healing, incomplete removal, allergy to anesthesia, nerve injury and recurrence were addressed.  Indication for Mohs was Location. Prior to the procedure, the treatment site was clearly identified and, if available, confirmed with previous photos and confirmed by the patient   All components of the Universal Protocol/PAUSE rule were completed.  The Mohs surgeon operated in two distinct and integrated capacities as the surgeon and pathologist.      The area was prepped with Betasept.  A rim of normal appearing skin was marked circumferentially around the lesion.  The area was infiltrated with local anesthesia.  The tumor was first debulked to remove all clinically apparent tumor.  An incision following the standard Mohs approach was done and the specimen was oriented,mapped and placed in 1 block(s).  Each specimen was then chromacoded and processed in the Mohs laboratory using standard Mohs technique and submitted for frozen section histology.  Frozen section analysis showed no residual tumor but CLEAR MARGINS.      The tumor was excised using standard Mohs technique in 1 stages(s).  CLEAR MARGINS  OBTAINED and Final defect size was 1 cm.     We discussed the options for wound management in full with the patient including risks/benefits/ possible outcomes.        REPAIR COMPLEX: Because of the tightness of the surrounding skin and Because of the size and full thickness nature of the defect, Because of the tightness of the surrounding skin, To maintain form and function, In order to avoid distortion and Because of the proximity to the nasal tip and ala, a complex closure was planned. After LE anesthesia and prep, Burow's triangles were excised in the relaxed skin tension lines. The wound edges were widely undermined greater than width of the defect on both sides by dissection in the subcutaneous plane until adequate tissue mobility was obtained. Hemostasis was obtained. The wound edges were closed in a layered fashion using Vicryl and Fast Absorbing Plain Gut sutures. Postoperative length was 3 cm.   EBL minimal; complications none; wound care routine.  The patient was discharged in good condition and will return in one week for wound evaluation.

## 2023-01-11 NOTE — PATIENT INSTRUCTIONS
Sutured Wound Care     [facility]: [phone]    [facility]: [phone]          No strenuous activity for 48 hours. Resume moderate activity in 48 hours. No heavy exercising until you are seen for follow up in one week.     Take Tylenol as needed for discomfort.                         Do not drink alcoholic beverages for 48 hours.     Keep the pressure bandage in place for 24 hours. If the bandage becomes blood tinged or loose, reinforce it with gauze and tape.        (Refer to the reverse side of this page for management of bleeding).    Remove pressure bandage in 24 hours (White Gauze & White Tape)    Leave the flat bandage in place until your follow up appointment. (Brown Flesh Color Tape & Steri Strips)    Keep the bandage dry. Wash around it carefully.    If the tape becomes soiled or starts to come off, reinforce it with additional paper tape.    Do not smoke for 3 weeks; smoking is detrimental to wound healing.    It is normal to have swelling and bruising around the surgical site. The bruising will fade in approximately 10-14 days. Elevate the area to reduce swelling.    Numbness, itchiness and sensitivity to temperature changes can occur after surgery and may take up to 18 months to normalize.      POSSIBLE COMPLICATIONS    BLEEDING:    Leave the bandage in place.  Use tightly rolled up gauze or a cloth to apply direct pressure over the bandage for 20   minutes.  Reapply pressure for an additional 20 minutes if necessary  Call the office or go to the nearest emergency room if pressure fails to stop the bleeding.  Use additional gauze and tape to maintain pressure once the bleeding has stopped.    PAIN:    Post operative pain should slowly get better, never worse.  A severe increase in pain may indicate a problem. Call the office if this occurs.    In case of emergency phone:Dr Sethi [phone]

## 2023-01-16 ENCOUNTER — NURSE TRIAGE (OUTPATIENT)
Dept: NURSING | Facility: CLINIC | Age: 58
End: 2023-01-16

## 2023-01-16 NOTE — TELEPHONE ENCOUNTER
Patient and wife calling.  Skin cancer removed from cheek 1-11-23.  Steri strips from rain/drainage.  Brown tape Micropore. The steri strips have come off but they have more Micropore to reinforce as per the AVS instructions.  Steri strips can start to come off 5-7 days and it has been five days.      Recommended to reinforce with the Micropore tape as instructed.  Patient has appointment for follow up on Thursday.  Patient will call dermatology in the morning to confirm.    Rafia Eid RN  [facility]        Reason for Disposition    [1] Caller has NON-URGENT question AND [2] triager unable to answer question    Additional Information    Negative: [1] Major abdominal surgical incision AND [2] wound gaping open AND [3] visible internal organs    Negative: Sounds like a life-threatening emergency to the triager    Negative: [1] Incision gaping open AND [2] < 48 hours since wound re-opened    Negative: Severe pain in the incision    Negative: [1] Incision gaping open AND [2] length of opening > 2 inches (5 cm)    Negative: Patient sounds very sick or weak to the triager    Negative: Sounds like a serious complication to the triager    Negative: Fever > 100.4 F (38.0 C)    Negative: [1] Incision looks infected (spreading redness, pain) AND [2] fever > 99.5 F (37.5 C)    Negative: [1] Incision looks infected (spreading redness, pain) AND [2] large red area (> 2 in. or 5 cm)    Negative: [1] Incision looks infected (spreading redness, pain) AND [2] face wound    Negative: [1] Red streak runs from the incision AND [2] longer than 1 inch (2.5 cm)    Negative: [1] Pus or bad-smelling fluid draining from incision AND [2] no fever    Negative: [1] Post-op pain AND [2] not controlled with pain medications    Negative: Dressing soaked with blood or body fluid (e.g., drainage)    Negative: [1] Raised bruise and [2] size > 2 inches (5 cm) and expanding    Negative: [1] Caller has URGENT question AND [2] triager  unable to answer question    Negative: [1] INCREASING pain in incision AND [2] > 2 days (48 hours) since surgery    Negative: [1] Small red area or streak AND [2] no fever    Negative: [1] Clear or blood-tinged fluid draining from wound AND [2] no fever    Negative: [1] Incision gaping open AND [2] > 48 hours since wound re-opened AND [3] length of opening > 1/2 inch (12 mm)    Negative: [1] Incision on face gaping open after skin glue AND [2] > 48 hours since wound re-opened 3] length of opening > 1/4 inch (6 mm)    Negative: Suture or staple removal is overdue    Negative: [1] Suture or staple came out early AND [2] caller wants wound checked    Protocols used: POST-OP INCISION SYMPTOMS-A-AH

## 2023-01-19 ENCOUNTER — ALLIED HEALTH/NURSE VISIT (OUTPATIENT)
Dept: DERMATOLOGY | Facility: CLINIC | Age: 58
End: 2023-01-19
Payer: COMMERCIAL

## 2023-01-19 DIAGNOSIS — Z48.00 ENCOUNTER FOR CHANGE OF DRESSING: Primary | ICD-10-CM

## 2023-01-19 PROCEDURE — 99207 PR NO CHARGE NURSE ONLY: CPT

## 2023-01-19 NOTE — PATIENT INSTRUCTIONS
WOUND CARE INSTRUCTIONS  for  ONE WEEK AFTER SURGERY          Leave flat bandage on your skin for one week after today s bandage change.  In one week  (1/25/2023) when you remove the bandage, you may resume your regular skin care routine, including washing with mild soap and water, applying moisturizer, make-up and sunscreen.    If there are any open or bleeding areas at the incision/graft site you should begin to cover the area with a bandage daily as follows:    Clean and dry the area with plain tap water using a Q-tip or sterile gauze pad.  Apply Polysporin or Bacitracin ointment to the open area.  Cover the wound with a band-aid or a sterile non-stick gauze pad and micropore paper tape.         SIGNS OF INFECTION  - If you notice any of these signs of infection, call your doctor right away: expanding redness around the wound.  - Yellow or greenish-colored pus or cloudy wound drainage.    - Red streaking spreading from the wound.  - Increased swelling, tenderness, or pain around the wound.   - Fever.    Please remember that yellow and clear drainage from a wound can be normal and related to normal wound healing.  Isolated drainage from a wound without a combination of the above features does not indicate infection.       *Once the bandages are removed, the scar will be red and firm (especially in the lip/chin area). This is normal and will fade in time. It might take 6-12 months for this to happen.     *Massaging the area will help the scar soften and fade quicker. Begin to massage the area one month after the bandages have been removed. To massage apply pressure directly and firmly over the scar with the fingertips and move in a circular motion. Massage the area for a few minutes several times a day. Continue to massage the site for several months.    *Approximately 6-8 weeks after surgery it is not uncommon to see the formation of  tender pimple-like  bump along the scar. This is normal. As the scar continues  to mature and the stitches underneath the skin begin to dissolve, this might occur. Do not pick or squeeze, this will resolve on it s own. Should one break open producing a small amount of drainage, apply Polysporin or Bacitracin ointment a few times a day until the wound is completely healed.    *Numbness in the surgical area is expected. It might take 12-18 months for the feeling to return to normal. During this time sensations of itchiness, tingling and occasional sharp pains might be noted. These feelings are normal and will subside once the nerves have completely healed.         IN CASE OF EMERGENCY: Dr Sethi [phone]     If you were seen in [place] call: [phone]    If you were seen in [place] call: [phone]

## 2023-01-19 NOTE — PROGRESS NOTES
Juma ROSS Patric comes into clinic today at the request of Navdeep Ordering Provider for Wound Check Action taken: See Below.    This service provided today was under the supervising provider of the haley Tabares , who was available if needed.    Pt returned to clinic for post surgery 1 week follow up bandage change. Pt has no complaints, denies pain. Bandage removed from right apical triangle , area cleansed with normal saline. Site is healing and wound edges approximating well. Reapplied new steri strips and paper tape.    Advised to watch for signs/sx of infection; spreading redness, drainage, odor, fever. Call or report promptly to clinic. Pt given written instructions and informed to rtc as needed. Patient verbalized understanding.     Kayla Lane MA

## 2023-02-21 DIAGNOSIS — I10 HYPERTENSION GOAL BP (BLOOD PRESSURE) < 140/90: ICD-10-CM

## 2023-02-21 NOTE — LETTER
[facility]  [address]  [place] [facility] [address]  [phone]  February 23, 2023    Juma Spivey  [id number] [place] [facility] [address]    Dear Juma,    We care about your health and have reviewed your health plan including your medical conditions, medication list, and lab results.  Based on this review, it is recommended that you follow up regarding the following health topic(s):     -Wellness (Physical) Visit - Appointment needed for medication refills!    Please call us at [phone] (or use [employer]) to schedule an appt to address the above recommendations.     Thank you for trusting [facility] and we appreciate the opportunity to serve you.  We look forward to supporting your healthcare needs in the future.    Healthy Regards,      Your Health Care Team  [facility]

## 2023-02-23 RX ORDER — LISINOPRIL/HYDROCHLOROTHIAZIDE 10-12.5 MG
TABLET ORAL
Qty: 90 TABLET | Refills: 0 | OUTPATIENT
Start: 2023-02-23

## 2023-03-22 ENCOUNTER — HOSPITAL ENCOUNTER (EMERGENCY)
Facility: CLINIC | Age: 58
Discharge: HOME | End: 2023-03-22
Attending: EMERGENCY MEDICINE | Admitting: EMERGENCY MEDICINE
Payer: COMMERCIAL

## 2023-03-22 ENCOUNTER — APPOINTMENT (OUTPATIENT)
Dept: GENERAL RADIOLOGY | Facility: CLINIC | Age: 58
End: 2023-03-22
Attending: EMERGENCY MEDICINE
Payer: COMMERCIAL

## 2023-03-22 ENCOUNTER — NURSE TRIAGE (OUTPATIENT)
Dept: FAMILY MEDICINE | Facility: CLINIC | Age: 58
End: 2023-03-22
Payer: COMMERCIAL

## 2023-03-22 VITALS
DIASTOLIC BLOOD PRESSURE: 80 MMHG | WEIGHT: 230 LBS | RESPIRATION RATE: 15 BRPM | SYSTOLIC BLOOD PRESSURE: 126 MMHG | BODY MASS INDEX: 32.2 KG/M2 | HEART RATE: 57 BPM | TEMPERATURE: 98.4 F | OXYGEN SATURATION: 99 % | HEIGHT: 71 IN

## 2023-03-22 DIAGNOSIS — R07.9 CHEST PAIN, UNSPECIFIED TYPE: ICD-10-CM

## 2023-03-22 LAB
ALBUMIN SERPL BCG-MCNC: 4.2 G/DL (ref 3.5–5.2)
ALP SERPL-CCNC: 80 U/L (ref 40–129)
ALT SERPL W P-5'-P-CCNC: 18 U/L (ref 10–50)
ANION GAP SERPL CALCULATED.3IONS-SCNC: 5 MMOL/L (ref 7–15)
AST SERPL W P-5'-P-CCNC: 25 U/L (ref 10–50)
BASOPHILS # BLD AUTO: 0 10E3/UL (ref 0–0.2)
BASOPHILS NFR BLD AUTO: 1 %
BILIRUB SERPL-MCNC: 0.3 MG/DL
BUN SERPL-MCNC: 13.8 MG/DL (ref 6–20)
CALCIUM SERPL-MCNC: 9.5 MG/DL (ref 8.6–10)
CHLORIDE SERPL-SCNC: 104 MMOL/L (ref 98–107)
CREAT SERPL-MCNC: 1.06 MG/DL (ref 0.67–1.17)
DEPRECATED HCO3 PLAS-SCNC: 32 MMOL/L (ref 22–29)
EOSINOPHIL # BLD AUTO: 0.3 10E3/UL (ref 0–0.7)
EOSINOPHIL NFR BLD AUTO: 4 %
ERYTHROCYTE [DISTWIDTH] IN BLOOD BY AUTOMATED COUNT: 12.6 % (ref 10–15)
GFR SERPL CREATININE-BSD FRML MDRD: 81 ML/MIN/1.73M2
GLUCOSE SERPL-MCNC: 83 MG/DL (ref 70–99)
HCT VFR BLD AUTO: 45.5 % (ref 40–53)
HGB BLD-MCNC: 15.1 G/DL (ref 13.3–17.7)
HOLD SPECIMEN: NORMAL
IMM GRANULOCYTES # BLD: 0 10E3/UL
IMM GRANULOCYTES NFR BLD: 0 %
LYMPHOCYTES # BLD AUTO: 1.7 10E3/UL (ref 0.8–5.3)
LYMPHOCYTES NFR BLD AUTO: 26 %
MCH RBC QN AUTO: 29.4 PG (ref 26.5–33)
MCHC RBC AUTO-ENTMCNC: 33.2 G/DL (ref 31.5–36.5)
MCV RBC AUTO: 89 FL (ref 78–100)
MONOCYTES # BLD AUTO: 0.4 10E3/UL (ref 0–1.3)
MONOCYTES NFR BLD AUTO: 6 %
NEUTROPHILS # BLD AUTO: 4.2 10E3/UL (ref 1.6–8.3)
NEUTROPHILS NFR BLD AUTO: 63 %
NRBC # BLD AUTO: 0 10E3/UL
NRBC BLD AUTO-RTO: 0 /100
PLATELET # BLD AUTO: 211 10E3/UL (ref 150–450)
POTASSIUM SERPL-SCNC: 4.5 MMOL/L (ref 3.4–5.3)
PROT SERPL-MCNC: 6.9 G/DL (ref 6.4–8.3)
RBC # BLD AUTO: 5.14 10E6/UL (ref 4.4–5.9)
SODIUM SERPL-SCNC: 141 MMOL/L (ref 136–145)
TROPONIN T SERPL HS-MCNC: 8 NG/L
WBC # BLD AUTO: 6.7 10E3/UL (ref 4–11)

## 2023-03-22 PROCEDURE — 71046 X-RAY EXAM CHEST 2 VIEWS: CPT

## 2023-03-22 PROCEDURE — 99285 EMERGENCY DEPT VISIT HI MDM: CPT | Mod: 25

## 2023-03-22 PROCEDURE — 93005 ELECTROCARDIOGRAM TRACING: CPT | Performed by: EMERGENCY MEDICINE

## 2023-03-22 PROCEDURE — 84484 ASSAY OF TROPONIN QUANT: CPT | Performed by: FAMILY MEDICINE

## 2023-03-22 PROCEDURE — 85004 AUTOMATED DIFF WBC COUNT: CPT | Performed by: FAMILY MEDICINE

## 2023-03-22 PROCEDURE — 80053 COMPREHEN METABOLIC PANEL: CPT | Performed by: FAMILY MEDICINE

## 2023-03-22 PROCEDURE — 93005 ELECTROCARDIOGRAM TRACING: CPT

## 2023-03-22 PROCEDURE — 36415 COLL VENOUS BLD VENIPUNCTURE: CPT | Performed by: FAMILY MEDICINE

## 2023-03-22 PROCEDURE — 80053 COMPREHEN METABOLIC PANEL: CPT | Performed by: EMERGENCY MEDICINE

## 2023-03-22 PROCEDURE — 93010 ELECTROCARDIOGRAM REPORT: CPT | Performed by: EMERGENCY MEDICINE

## 2023-03-22 PROCEDURE — 84484 ASSAY OF TROPONIN QUANT: CPT | Performed by: EMERGENCY MEDICINE

## 2023-03-22 PROCEDURE — 85025 COMPLETE CBC W/AUTO DIFF WBC: CPT | Performed by: EMERGENCY MEDICINE

## 2023-03-22 PROCEDURE — 99285 EMERGENCY DEPT VISIT HI MDM: CPT | Mod: 25 | Performed by: EMERGENCY MEDICINE

## 2023-03-22 ASSESSMENT — ACTIVITIES OF DAILY LIVING (ADL): ADLS_ACUITY_SCORE: 35

## 2023-03-22 NOTE — TELEPHONE ENCOUNTER
"  Reason for Disposition    SEVERE chest pain    Additional Information    Negative: SEVERE difficulty breathing (e.g., struggling for each breath, speaks in single words)    Negative: Passed out (i.e., fainted, collapsed and was not responding)    Negative: Difficult to awaken or acting confused (e.g., disoriented, slurred speech)    Negative: Shock suspected (e.g., cold/pale/clammy skin, too weak to stand, low BP, rapid pulse)    Negative: Chest pain lasting longer than 5 minutes and ANY of the following:* Over 44 years old* Over 30 years old and at least one cardiac risk factor (e.g., diabetes mellitus, high blood pressure, high cholesterol, smoker, or strong family history of heart disease)* History of heart disease (i.e., angina, heart attack, heart failure, bypass surgery, takes nitroglycerin)* Pain is crushing, pressure-like, or heavy    Negative: Heart beating < 50 beats per minute OR > 140 beats per minute    Negative: Visible sweat on face or sweat dripping down face    Negative: Sounds like a life-threatening emergency to the triager    Negative: Followed an injury to chest    Answer Assessment - Initial Assessment Questions  1. LOCATION: \"Where does it hurt?\"        Patient's wife who has consent to communicate reports patient has chest pain on the left side   2. RADIATION: \"Does the pain go anywhere else?\" (e.g., into neck, jaw, arms, back)      Yes, radiates from armpit and across the chest  3. ONSET: \"When did the chest pain begin?\" (Minutes, hours or days)       Started 3 weeks ago and is getting worse  4. PATTERN \"Does the pain come and go, or has it been constant since it started?\"  \"Does it get worse with exertion?\"       Comes and goes, worse today  5. DURATION: \"How long does it last\" (e.g., seconds, minutes, hours)      Lasts for 10 seconds, has been having increase in frequency and worse today  6. SEVERITY: \"How bad is the pain?\"  (e.g., Scale 1-10; mild, moderate, or severe)     - MILD (1-3): " "doesn't interfere with normal activities      - MODERATE (4-7): interferes with normal activities or awakens from sleep     - SEVERE (8-10): excruciating pain, unable to do any normal activities        Severe, describes as sharp and shooting  7. CARDIAC RISK FACTORS: \"Do you have any history of heart problems or risk factors for heart disease?\" (e.g., angina, prior heart attack; diabetes, high blood pressure, high cholesterol, smoker, or strong family history of heart disease)      See health history  8. PULMONARY RISK FACTORS: \"Do you have any history of lung disease?\"  (e.g., blood clots in lung, asthma, emphysema, birth control pills)      See health history   9. CAUSE: \"What do you think is causing the chest pain?\"      Patient is not sure and wife is concerned  10. OTHER SYMPTOMS: \"Do you have any other symptoms?\" (e.g., dizziness, nausea, vomiting, sweating, fever, difficulty breathing, cough)        Denies dizziness, no nausea, no diaphoresis, not fatigued and not short of breath  11. PREGNANCY: \"Is there any chance you are pregnant?\" \"When was your last menstrual period?\"        na    Protocols used: CHEST PAIN-A-OH    "

## 2023-03-22 NOTE — ED NOTES
"Pt c/o \"dull/achy chest pain for past 2 weeks.\"  Today pain was \"a little worse and more constant.\"  Pain started around axilla and now also around left breast.  No n/v/d, soa, recent illness, or fevers.  Pt states that if the pain was on the right side of his chest he would not be here.  Just concerned it could be his heart.   "

## 2023-03-22 NOTE — ED TRIAGE NOTES
Pt states 2-3 weeks ago, CP that comes and goes.  Today it started at 8 or 9am and has been constant.  L axilla to center, 4/10, earlier 6/10 while driving a car.  Nothing seems to make it come or go.  Pt has not been taking any medications for it.  2 months ago pt had a bad cold, with cough, neg for COVID.  Pt non smoker, denies DM, or high cholesterol, he does have HTN.  Pt denies family h/o heart dz.

## 2023-03-23 NOTE — DISCHARGE INSTRUCTIONS
Your evaluation in the ED was reassuring however no cause of your symptoms was identified.     Please return to the emergency department if you develop severe and persistent chest pain, difficulty breathing, dizziness, leg swelling or if you are coughing up blood as these can be signs of a medical emergency. Please call your doctor for a follow up appointment in 2-3 days to determine the need for further testing.

## 2023-03-30 ASSESSMENT — ENCOUNTER SYMPTOMS
NAUSEA: 0
SORE THROAT: 0
PARESTHESIAS: 0
HEADACHES: 0
DIARRHEA: 0
HEARTBURN: 0
CHILLS: 0
MYALGIAS: 0
FREQUENCY: 0
FEVER: 0
JOINT SWELLING: 0
HEMATOCHEZIA: 0
DIZZINESS: 0
ARTHRALGIAS: 0
HEMATURIA: 0
DYSURIA: 0
NERVOUS/ANXIOUS: 0
SHORTNESS OF BREATH: 0
ABDOMINAL PAIN: 0
WEAKNESS: 0
EYE PAIN: 0
COUGH: 0
PALPITATIONS: 0
CONSTIPATION: 0

## 2023-03-31 ENCOUNTER — OFFICE VISIT (OUTPATIENT)
Dept: FAMILY MEDICINE | Facility: CLINIC | Age: 58
End: 2023-03-31
Payer: COMMERCIAL

## 2023-03-31 VITALS
WEIGHT: 231 LBS | OXYGEN SATURATION: 98 % | RESPIRATION RATE: 16 BRPM | DIASTOLIC BLOOD PRESSURE: 76 MMHG | HEIGHT: 70 IN | TEMPERATURE: 97.1 F | BODY MASS INDEX: 33.07 KG/M2 | HEART RATE: 64 BPM | SYSTOLIC BLOOD PRESSURE: 112 MMHG

## 2023-03-31 DIAGNOSIS — Z00.00 WELL ADULT EXAM: Primary | ICD-10-CM

## 2023-03-31 DIAGNOSIS — R07.9 CHEST PAIN, UNSPECIFIED TYPE: ICD-10-CM

## 2023-03-31 DIAGNOSIS — I10 HYPERTENSION GOAL BP (BLOOD PRESSURE) < 140/90: ICD-10-CM

## 2023-03-31 PROCEDURE — 99214 OFFICE O/P EST MOD 30 MIN: CPT | Mod: 25 | Performed by: FAMILY MEDICINE

## 2023-03-31 PROCEDURE — 99396 PREV VISIT EST AGE 40-64: CPT | Mod: 25 | Performed by: FAMILY MEDICINE

## 2023-03-31 RX ORDER — LISINOPRIL/HYDROCHLOROTHIAZIDE 10-12.5 MG
1 TABLET ORAL DAILY
Qty: 90 TABLET | Refills: 4 | Status: SHIPPED | OUTPATIENT
Start: 2023-03-31 | End: 2024-04-10

## 2023-03-31 ASSESSMENT — ENCOUNTER SYMPTOMS
FEVER: 0
CONSTIPATION: 0
FREQUENCY: 0
WEAKNESS: 0
PARESTHESIAS: 0
HEARTBURN: 0
SHORTNESS OF BREATH: 0
HEMATURIA: 0
DIZZINESS: 0
SORE THROAT: 0
NAUSEA: 0
NERVOUS/ANXIOUS: 0
DIARRHEA: 0
PALPITATIONS: 0
ARTHRALGIAS: 0
HEMATOCHEZIA: 0
EYE PAIN: 0
HEADACHES: 0
CHILLS: 0
DYSURIA: 0
MYALGIAS: 0
COUGH: 0
ABDOMINAL PAIN: 0
JOINT SWELLING: 0

## 2023-03-31 ASSESSMENT — PAIN SCALES - GENERAL: PAINLEVEL: NO PAIN (0)

## 2023-03-31 NOTE — PROGRESS NOTES
SUBJECTIVE:   CC: Carlos is an 57 year old who presents for preventative health visit.   No flowsheet data found.Patient has been advised of split billing requirements and indicates understanding: Yes  Healthy Habits:     Getting at least 3 servings of Calcium per day:  Yes    Bi-annual eye exam:  Yes    Dental care twice a year:  Yes    Sleep apnea or symptoms of sleep apnea:  None    Diet:  Regular (no restrictions)    Frequency of exercise:  2-3 days/week    Duration of exercise:  15-30 minutes    Taking medications regularly:  Yes    Medication side effects:  None    PHQ-2 Total Score: 0    Additional concerns today:  No          ED/UC Followup:    Facility:  [facility] [facility]  Date of visit: 3/22/2023  Reason for visit: Chest Pain  Current Status: improved quite a bit      Today's PHQ-2 Score:       3/30/2023     6:47 PM   PHQ-2 ( 1999 Pfizer)   Q1: Little interest or pleasure in doing things 0   Q2: Feeling down, depressed or hopeless 0   PHQ-2 Score 0   Q1: Little interest or pleasure in doing things Not at all   Q2: Feeling down, depressed or hopeless Not at all   PHQ-2 Score 0           Social History     Tobacco Use     Smoking status: Never     Smokeless tobacco: Never   Vaping Use     Vaping status: Not on file   Substance Use Topics     Alcohol use: Yes     Comment: occ.             3/30/2023     6:46 PM   Alcohol Use   Prescreen: >3 drinks/day or >7 drinks/week? No       Last PSA: No results found for: PSA    Reviewed orders with patient. Reviewed health maintenance and updated orders accordingly - Yes  Labs reviewed in EPIC  Patient Active Problem List   Diagnosis     Lymphedema     Hypertension goal BP (blood pressure) < 140/90     Hyperlipidemia LDL goal <130     Overweight     Microscopic hematuria     Colon polyp     Advanced directives, counseling/discussion     History reviewed. No pertinent surgical history.    Social History     Tobacco Use     Smoking status: Never     Smokeless tobacco: Never   Vaping Use      Vaping status: Not on file   Substance Use Topics     Alcohol use: Yes     Comment: occ.     Family History   Problem Relation Age of Onset     Hypertension Mother      Neurologic Disorder Mother         brain bleeds     Alcohol/Drug Father      Breast Cancer Sister      Skin Cancer Sister          Current Outpatient Medications   Medication Sig Dispense Refill     lisinopril-hydrochlorothiazide (ZESTORETIC) 10-12.5 MG tablet Take 1 tablet by mouth daily 90 tablet 4     naproxen sodium (ANAPROX) 220 MG tablet Take 220 mg by mouth 2 times daily (with meals)       Allergies   Allergen Reactions     Penicillins      Thinks he may be allergic because his father was       Reviewed and updated as needed this visit by clinical staff   Tobacco  Allergies  Meds  Problems  Med Hx  Surg Hx  Fam Hx          Reviewed and updated as needed this visit by Provider   Tobacco  Allergies  Meds  Problems  Med Hx  Surg Hx  Fam Hx         Past Medical History:   Diagnosis Date     Other lymphedema       History reviewed. No pertinent surgical history.    Review of Systems   Constitutional: Negative for chills and fever.   HENT: Negative for congestion, ear pain, hearing loss and sore throat.    Eyes: Negative for pain and visual disturbance.   Respiratory: Negative for cough and shortness of breath.    Cardiovascular: Positive for chest pain. Negative for palpitations and peripheral edema.   Gastrointestinal: Negative for abdominal pain, constipation, diarrhea, heartburn, hematochezia and nausea.   Genitourinary: Negative for dysuria, frequency, genital sores, hematuria, impotence, penile discharge and urgency.   Musculoskeletal: Negative for arthralgias, joint swelling and myalgias.   Skin: Negative for rash.   Neurological: Negative for dizziness, weakness, headaches and paresthesias.   Psychiatric/Behavioral: Negative for mood changes. The patient is not nervous/anxious.          OBJECTIVE:   /76   Pulse 64    "Temp 97.1  F (36.2  C) (Tympanic)   Resp 16   Ht 1.778 m (5' 10\")   Wt 104.8 kg (231 lb)   SpO2 98%   BMI 33.15 kg/m      Physical Exam  GENERAL: healthy, alert and no distress  EYES: Eyes grossly normal to inspection, PERRL and conjunctivae and sclerae normal  HENT: normal cephalic/atraumatic and ear canals and TM's normal  NECK: no adenopathy, no asymmetry, masses, or scars and thyroid normal to palpation  RESP: lungs clear to auscultation - no rales, rhonchi or wheezes  CV: regular rate and rhythm, normal S1 S2, no S3 or S4, no murmur, click or rub, no peripheral edema and peripheral pulses strong  ABDOMEN: soft, nontender, no hepatosplenomegaly, no masses and bowel sounds normal  MS: no gross musculoskeletal defects noted, no edema  SKIN: no suspicious lesions or rashes  NEURO: Normal strength and tone, mentation intact and speech normal  PSYCH: mentation appears normal, affect normal/bright    Diagnostic Test Results:  Labs reviewed in Epic    ASSESSMENT/PLAN:   Well adult exam    Chest pain, unspecified type  Was seen in ED. Work-up negative for cardiac etiology. Recommend Stress ECHO to complete work-up.  - Echocardiogram Exercise Stress; Future    Hypertension goal BP (blood pressure) < 140/90  Well controlled. Refilled medication.   Labs up to date.  - lisinopril-hydrochlorothiazide (ZESTORETIC) 10-12.5 MG tablet; Take 1 tablet by mouth daily          Patient has been advised of split billing requirements and indicates understanding: Yes      COUNSELING:   Reviewed preventive health counseling, as reflected in patient instructions      BMI:   Estimated body mass index is 33.15 kg/m  as calculated from the following:    Height as of this encounter: 1.778 m (5' 10\").    Weight as of this encounter: 104.8 kg (231 lb).   Weight management plan: Discussed healthy diet and exercise guidelines      He reports that he has never smoked. He has never used smokeless tobacco.        Adri Harry MD  M " [facility]

## 2023-04-06 ENCOUNTER — HOSPITAL ENCOUNTER (OUTPATIENT)
Dept: CARDIOLOGY | Facility: CLINIC | Age: 58
Discharge: HOME | End: 2023-04-06
Attending: FAMILY MEDICINE | Admitting: FAMILY MEDICINE
Payer: COMMERCIAL

## 2023-04-06 DIAGNOSIS — R07.9 CHEST PAIN, UNSPECIFIED TYPE: ICD-10-CM

## 2023-04-06 PROCEDURE — 93018 CV STRESS TEST I&R ONLY: CPT | Performed by: INTERNAL MEDICINE

## 2023-04-06 PROCEDURE — 93325 DOPPLER ECHO COLOR FLOW MAPG: CPT | Mod: 26 | Performed by: INTERNAL MEDICINE

## 2023-04-06 PROCEDURE — 93321 DOPPLER ECHO F-UP/LMTD STD: CPT | Mod: TC

## 2023-04-06 PROCEDURE — 999N000208 ECHO STRESS ECHOCARDIOGRAM

## 2023-04-06 PROCEDURE — 93016 CV STRESS TEST SUPVJ ONLY: CPT | Performed by: INTERNAL MEDICINE

## 2023-04-06 PROCEDURE — 93321 DOPPLER ECHO F-UP/LMTD STD: CPT | Mod: 26 | Performed by: INTERNAL MEDICINE

## 2023-04-06 PROCEDURE — 93350 STRESS TTE ONLY: CPT | Mod: 26 | Performed by: INTERNAL MEDICINE

## 2023-04-06 PROCEDURE — 255N000002 HC RX 255 OP 636: Performed by: FAMILY MEDICINE

## 2023-04-06 RX ADMIN — HUMAN ALBUMIN MICROSPHERES AND PERFLUTREN 2 ML: 10; .22 INJECTION, SOLUTION INTRAVENOUS at 10:17

## 2024-04-06 DIAGNOSIS — I10 HYPERTENSION GOAL BP (BLOOD PRESSURE) < 140/90: ICD-10-CM

## 2024-04-06 NOTE — LETTER
[facility]  [id number]  [facility] [address]  [phone]  April 10, 2024    Juma Spivey  [id number] [facility] [address]    Dear Juma,    We care about your health and have reviewed your health plan including your medical conditions, medication list, and lab results.  Based on this review, it is recommended that you follow up regarding the following health topic(s):     -Wellness (Physical) Visit & Medication Refills - Appointment Needed    Please call us at [phone] (or use [employer]) to address the above recommendations.     Thank you for trusting [facility] and we appreciate the opportunity to serve you.  We look forward to supporting your healthcare needs in the future.    Healthy Regards,      Your Health Care Team  [facility]

## 2024-04-08 NOTE — TELEPHONE ENCOUNTER
Requested Prescriptions   Pending Prescriptions Disp Refills    lisinopril-hydrochlorothiazide (ZESTORETIC) 10-12.5 MG tablet [Pharmacy Med Name: LISINOPRIL-HCTZ 10-12.5 MG TAB] 90 tablet 4     Sig: TAKE 1 TABLET BY MOUTH EVERY DAY       Diuretics (Including Combos) Protocol Failed - 4/6/2024  7:31 AM        Failed - Blood pressure under 140/90 in past 12 months     BP Readings from Last 3 Encounters:   03/31/23 112/76   03/22/23 126/80   11/18/21 124/72       No data recorded            Failed - Has GFR on file in past 12 months and most recent value is normal        Failed - Recent (12 mo) or future (90 days) visit within the authorizing provider's specialty     The patient must have completed an in-person or virtual visit within the past 12 months or has a future visit scheduled within the next 90 days with the authorizing provider s specialty.  Urgent care and e-visits do not quality as an office visit for this protocol.          Passed - Medication is active on med list        Passed - Medication indicated for associated diagnosis     Medication is associated with one or more of the following diagnoses:     Edema   Hypertension   Heart Failure   Meniere's Disease          Passed - Patient is age 18 or older       ACE Inhibitors (Including Combos) Protocol Failed - 4/6/2024  7:31 AM        Failed - Blood pressure under 140/90 in past 12 months- Clinicial or Patient Reported     BP Readings from Last 3 Encounters:   03/31/23 112/76   03/22/23 126/80   11/18/21 124/72       No data recorded            Failed - Has GFR on file in past 12 months and most recent value is normal        Failed - Recent (12 mo) or future (90 days) visit within the authorizing provider's specialty     The patient must have completed an in-person or virtual visit within the past 12 months or has a future visit scheduled within the next 90 days with the authorizing provider s specialty.  Urgent care and e-visits do not quality as an office  visit for this protocol.          Failed - Normal serum creatinine on file in past 12 months     Recent Labs   Lab Test 03/22/23  1717   CR 1.06                 Failed - Normal serum potassium on file in past 12 months     Recent Labs   Lab Test 03/22/23  1717   POTASSIUM 4.5             Passed - Medication is active on med list        Passed - Medication indicated for associated diagnosis     Medication is associated with one or more of the following diagnoses:     Chronic Kidney Disease (CKD)   Coronary Artery Disease (CAD)   Diabetes   Heart Failure (HF)   Hypertension (HTN)   Nephropathy            Passed - Patient is age 18 or older

## 2024-04-10 RX ORDER — LISINOPRIL/HYDROCHLOROTHIAZIDE 10-12.5 MG
1 TABLET ORAL DAILY
Qty: 90 TABLET | Refills: 0 | Status: SHIPPED | OUTPATIENT
Start: 2024-04-10 | End: 2024-05-15

## 2024-05-15 ENCOUNTER — OFFICE VISIT (OUTPATIENT)
Dept: FAMILY MEDICINE | Facility: CLINIC | Age: 59
End: 2024-05-15
Payer: COMMERCIAL

## 2024-05-15 ENCOUNTER — LAB (OUTPATIENT)
Dept: LAB | Facility: CLINIC | Age: 59
End: 2024-05-15
Payer: COMMERCIAL

## 2024-05-15 VITALS
BODY MASS INDEX: 31.29 KG/M2 | WEIGHT: 231 LBS | DIASTOLIC BLOOD PRESSURE: 82 MMHG | HEART RATE: 64 BPM | TEMPERATURE: 97.6 F | SYSTOLIC BLOOD PRESSURE: 130 MMHG | HEIGHT: 72 IN | OXYGEN SATURATION: 97 % | RESPIRATION RATE: 16 BRPM

## 2024-05-15 DIAGNOSIS — I10 HYPERTENSION GOAL BP (BLOOD PRESSURE) < 140/90: ICD-10-CM

## 2024-05-15 DIAGNOSIS — Z12.5 SCREENING FOR PROSTATE CANCER: ICD-10-CM

## 2024-05-15 DIAGNOSIS — E78.5 HYPERLIPIDEMIA LDL GOAL <130: ICD-10-CM

## 2024-05-15 DIAGNOSIS — I89.0 LYMPHEDEMA: ICD-10-CM

## 2024-05-15 DIAGNOSIS — Z00.00 ROUTINE GENERAL MEDICAL EXAMINATION AT A HEALTH CARE FACILITY: Primary | ICD-10-CM

## 2024-05-15 LAB
ANION GAP SERPL CALCULATED.3IONS-SCNC: 10 MMOL/L (ref 7–15)
BUN SERPL-MCNC: 18.4 MG/DL (ref 8–23)
CALCIUM SERPL-MCNC: 9 MG/DL (ref 8.6–10)
CHLORIDE SERPL-SCNC: 104 MMOL/L (ref 98–107)
CHOLEST SERPL-MCNC: 210 MG/DL
CREAT SERPL-MCNC: 1.05 MG/DL (ref 0.67–1.17)
DEPRECATED HCO3 PLAS-SCNC: 26 MMOL/L (ref 22–29)
EGFRCR SERPLBLD CKD-EPI 2021: 82 ML/MIN/1.73M2
FASTING STATUS PATIENT QL REPORTED: YES
FASTING STATUS PATIENT QL REPORTED: YES
GLUCOSE SERPL-MCNC: 96 MG/DL (ref 70–99)
HDLC SERPL-MCNC: 43 MG/DL
LDLC SERPL CALC-MCNC: 154 MG/DL
NONHDLC SERPL-MCNC: 167 MG/DL
POTASSIUM SERPL-SCNC: 4.6 MMOL/L (ref 3.4–5.3)
PSA SERPL DL<=0.01 NG/ML-MCNC: 3.26 NG/ML (ref 0–3.5)
SODIUM SERPL-SCNC: 140 MMOL/L (ref 135–145)
TRIGL SERPL-MCNC: 65 MG/DL

## 2024-05-15 PROCEDURE — 99396 PREV VISIT EST AGE 40-64: CPT | Performed by: FAMILY MEDICINE

## 2024-05-15 PROCEDURE — 80048 BASIC METABOLIC PNL TOTAL CA: CPT

## 2024-05-15 PROCEDURE — 80061 LIPID PANEL: CPT

## 2024-05-15 PROCEDURE — 36415 COLL VENOUS BLD VENIPUNCTURE: CPT

## 2024-05-15 PROCEDURE — G0103 PSA SCREENING: HCPCS

## 2024-05-15 RX ORDER — LISINOPRIL/HYDROCHLOROTHIAZIDE 10-12.5 MG
1 TABLET ORAL DAILY
Qty: 90 TABLET | Refills: 4 | Status: SHIPPED | OUTPATIENT
Start: 2024-05-15

## 2024-05-15 SDOH — HEALTH STABILITY: PHYSICAL HEALTH: ON AVERAGE, HOW MANY DAYS PER WEEK DO YOU ENGAGE IN MODERATE TO STRENUOUS EXERCISE (LIKE A BRISK WALK)?: 2 DAYS

## 2024-05-15 SDOH — HEALTH STABILITY: PHYSICAL HEALTH: ON AVERAGE, HOW MANY MINUTES DO YOU ENGAGE IN EXERCISE AT THIS LEVEL?: 10 MIN

## 2024-05-15 ASSESSMENT — PAIN SCALES - GENERAL: PAINLEVEL: NO PAIN (0)

## 2024-05-15 ASSESSMENT — SOCIAL DETERMINANTS OF HEALTH (SDOH): HOW OFTEN DO YOU GET TOGETHER WITH FRIENDS OR RELATIVES?: PATIENT DECLINED

## 2024-05-15 NOTE — NURSING NOTE
"Initial /82   Pulse 64   Temp 97.6  F (36.4  C) (Tympanic)   Resp 16   Ht 1.816 m (5' 11.5\")   Wt 104.8 kg (231 lb)   SpO2 97%   BMI 31.77 kg/m   Estimated body mass index is 31.77 kg/m  as calculated from the following:    Height as of this encounter: 1.816 m (5' 11.5\").    Weight as of this encounter: 104.8 kg (231 lb). .    "

## 2024-05-15 NOTE — PROGRESS NOTES
"Preventive Care Visit  [facility]  Adri Harry MD, Family Medicine  May 15, 2024      Assessment & Plan     Routine general medical examination at a health care facility    Hyperlipidemia LDL goal <130  Not on medication. Check labs.   - Lipid panel reflex to direct LDL Non-fasting; Future    Hypertension goal BP (blood pressure) < 140/90  Well controlled. Refilled medication. Check labs.    - lisinopril-hydrochlorothiazide (ZESTORETIC) 10-12.5 MG tablet; Take 1 tablet by mouth daily  - Basic metabolic panel; Future    Lymphedema  Chronic lymphedema with history of recurrent cellulitis requiring compression stockings, Since using stockings has not had any recurrent cellulitis. Stocking prescription renewed.  - Orthotics, Mastectomy and Custom Compression Orders    Screening for prostate cancer  - Prostate Specific Antigen Screen; Future    Patient has been advised of split billing requirements and indicates understanding: Yes        BMI  Estimated body mass index is 31.77 kg/m  as calculated from the following:    Height as of this encounter: 1.816 m (5' 11.5\").    Weight as of this encounter: 104.8 kg (231 lb).       Counseling  Appropriate preventive services were discussed with this patient, including applicable screening as appropriate for fall prevention, nutrition, physical activity, Tobacco-use cessation, weight loss and cognition.  Checklist reviewing preventive services available has been given to the patient.  Reviewed patient's diet, addressing concerns and/or questions.   He is at risk for lack of exercise and has been provided with information to increase physical activity for the benefit of his well-being.           Won Gonzalez is a 58 year old, presenting for the following:  Physical and Hypertension        5/15/2024     7:09 AM   Additional Questions   Roomed by [place] CMA        Health Care Directive  Patient does not have a Health Care Directive or " "Living Will:     HPI              5/15/2024   General Health   How would you rate your overall physical health? Good   Feel stress (tense, anxious, or unable to sleep) Not at all         5/15/2024   Nutrition   Three or more servings of calcium each day? Yes   Diet: Low salt   How many servings of fruit and vegetables per day? (!) 2-3   How many sweetened beverages each day? 0-1         5/15/2024   Exercise   Days per week of moderate/strenous exercise 2 days   Average minutes spent exercising at this level 10 min   (!) EXERCISE CONCERN      5/15/2024   Social Factors   Frequency of gathering with friends or relatives Patient declined   Worry food won't last until get money to buy more No   Food not last or not have enough money for food? No   Do you have housing?  Yes   Are you worried about losing your housing? No   Lack of transportation? No   Unable to get utilities (heat,electricity)? No         5/15/2024   Fall Risk   Fallen 2 or more times in the past year? No   Trouble with walking or balance? No          5/15/2024   Dental   Dentist two times every year? Yes         5/15/2024   TB Screening   Were you born outside of the [place]? Yes         Today's PHQ-2 Score:       5/15/2024     6:43 AM   PHQ-2 ( 1999 Pfizer)   Q1: Little interest or pleasure in doing things 0   Q2: Feeling down, depressed or hopeless 0   PHQ-2 Score 0   Q1: Little interest or pleasure in doing things Not at all   Q2: Feeling down, depressed or hopeless Not at all   PHQ-2 Score 0           5/15/2024   Substance Use   Alcohol more than 3/day or more than 7/wk No   Do you use any other substances recreationally? No     Social History     Tobacco Use    Smoking status: Never    Smokeless tobacco: Never   Substance Use Topics    Alcohol use: Yes     Comment: occ.    Drug use: No           5/15/2024   STI Screening   New sexual partner(s) since last STI/HIV test? No   Last PSA: No results found for: \"PSA\"  ASCVD Risk   The 10-year ASCVD " risk score (Sejal BYRD, et al., 2019) is: 9.5%    Values used to calculate the score:      Age: 58 years      Sex: Male      Is Non- : No      Diabetic: No      Tobacco smoker: No      Systolic Blood Pressure: 130 mmHg      Is BP treated: Yes      HDL Cholesterol: 46 mg/dL      Total Cholesterol: 187 mg/dL           Reviewed and updated as needed this visit by Provider   Tobacco  Allergies  Meds  Problems  Med Hx  Surg Hx  Fam Hx            Past Medical History:   Diagnosis Date    Other lymphedema      History reviewed. No pertinent surgical history.  Labs reviewed in EPIC  Patient Active Problem List   Diagnosis    Lymphedema    Hypertension goal BP (blood pressure) < 140/90    Hyperlipidemia LDL goal <130    Overweight    Microscopic hematuria    Colon polyp    Advanced directives, counseling/discussion     History reviewed. No pertinent surgical history.    Social History     Tobacco Use    Smoking status: Never    Smokeless tobacco: Never   Substance Use Topics    Alcohol use: Yes     Comment: occ.     Family History   Problem Relation Age of Onset    Hypertension Mother     Neurologic Disorder Mother         brain bleeds    Alcohol/Drug Father     Breast Cancer Sister     Skin Cancer Sister          Current Outpatient Medications   Medication Sig Dispense Refill    lisinopril-hydrochlorothiazide (ZESTORETIC) 10-12.5 MG tablet Take 1 tablet by mouth daily 90 tablet 4    naproxen sodium (ANAPROX) 220 MG tablet Take 220 mg by mouth 2 times daily (with meals)       Allergies   Allergen Reactions    Penicillins      Thinks he may be allergic because his father was         Review of Systems  Constitutional, neuro, ENT, endocrine, pulmonary, cardiac, gastrointestinal, genitourinary, musculoskeletal, integument and psychiatric systems are negative, except as otherwise noted.     Objective    Exam  /82   Pulse 64   Temp 97.6  F (36.4  C) (Tympanic)   Resp 16   Ht 1.816 m (5'  "11.5\")   Wt 104.8 kg (231 lb)   SpO2 97%   BMI 31.77 kg/m     Estimated body mass index is 31.77 kg/m  as calculated from the following:    Height as of this encounter: 1.816 m (5' 11.5\").    Weight as of this encounter: 104.8 kg (231 lb).    Physical Exam  GENERAL: alert and no distress  EYES: Eyes grossly normal to inspection, PERRL and conjunctivae and sclerae normal  HENT: normal cephalic/atraumatic and ear canals and TM's normal  NECK: no adenopathy, no asymmetry, masses, or scars  RESP: lungs clear to auscultation - no rales, rhonchi or wheezes  CV: regular rate and rhythm, normal S1 S2, no S3 or S4, no murmur, click or rub, no peripheral edema No carotid bruits.   ABDOMEN: soft, nontender, no hepatosplenomegaly, no masses and bowel sounds normal  MS: no gross musculoskeletal defects noted, no edema  SKIN: no suspicious lesions or rashes  NEURO: Normal strength and tone, mentation intact and speech normal  PSYCH: mentation appears normal, affect normal/bright        Signed Electronically by: Adri Harry MD    DME (Durable Medical Equipment) Orders and Documentation  Orders Placed This Encounter   Procedures    Orthotics, Mastectomy and Custom Compression Orders        The patient was assessed and it was determined the patient is in need of the following listed DME Supplies/Equipment. Please complete supporting documentation below to demonstrate medical necessity.      Chronic lymphedema with history of recurrent cellulitis requiring compression stockings, Since using stockings has not had any recurrent cellulitis.       "

## 2024-05-15 NOTE — PATIENT INSTRUCTIONS
"Preventive Care Advice   This is general advice we often give to help people stay healthy. Your care team may have specific advice just for you. Please talk to your care team about your own preventive care needs.  Lifestyle  Exercise at least 150 minutes each week (30 minutes a day, 5 days a week).  Do muscle strengthening activities 2 days a week. These help control your weight and prevent disease.  No smoking.  Wear sunscreen to prevent skin cancer.  Have your home tested for radon every 2 to 5 years. Radon is a colorless, odorless gas that can harm your lungs. To learn more, go to www.health.[facility].mn. and search for \"Radon in Homes.\"  Keep guns unloaded and locked up in a safe place like a safe or gun vault, or, use a gun lock and hide the keys. Always lock away bullets separately. To learn more, visit [employer].gov and search for \"safe gun storage.\"  Nutrition  Eat 5 or more servings of fruits and vegetables each day.  Try wheat bread, brown rice and whole grain pasta (instead of white bread, rice, and pasta).  Get enough calcium and vitamin D. Check the label on foods and aim for 100% of the RDA (recommended daily allowance).  Regular exams  Have a dental exam and cleaning every 6 months.  See your health care team every year to talk about:  Any changes in your health.  Any medicines your care team has prescribed.  Preventive care, family planning, and ways to prevent chronic diseases.  Shots (vaccines)   HPV shots (up to age 26), if you've never had them before.  Hepatitis B shots (up to age 59), if you've never had them before.  COVID-19 shot: Get this shot when it's due.  Flu shot: Get a flu shot every year.  Tetanus shot: Get a tetanus shot every 10 years.  Pneumococcal, hepatitis A, and RSV shots: Ask your care team if you need these based on your risk.  Shingles shot (for age 50 and up).  General health tests  Diabetes screening:  Starting at age 35, Get screened for diabetes at least every 3 years.  If " you are younger than age 35, ask your care team if you should be screened for diabetes.  Cholesterol test: At age 39, start having a cholesterol test every 5 years, or more often if advised.  Bone density scan (DEXA): At age 50, ask your care team if you should have this scan for osteoporosis (brittle bones).  Hepatitis C: Get tested at least once in your life.  Abdominal aortic aneurysm screening: Talk to your doctor about having this screening if you:  Have ever smoked; and  Are biologically male; and  Are between the ages of 65 and 75.  STIs (sexually transmitted infections)  Before age 24: Ask your care team if you should be screened for STIs.  After age 24: Get screened for STIs if you're at risk. You are at risk for STIs (including HIV) if:  You are sexually active with more than one person.  You don't use condoms every time.  You or a partner was diagnosed with a sexually transmitted infection.  If you are at risk for HIV, ask about PrEP medicine to prevent HIV.  Get tested for HIV at least once in your life, whether you are at risk for HIV or not.  Cancer screening tests  Cervical cancer screening: If you have a cervix, begin getting regular cervical cancer screening tests at age 21. Most people who have regular screenings with normal results can stop after age 65. Talk about this with your provider.  Breast cancer scan (mammogram): If you've ever had breasts, begin having regular mammograms starting at age 40. This is a scan to check for breast cancer.  Colon cancer screening: It is important to start screening for colon cancer at age 45.  Have a colonoscopy test every 10 years (or more often if you're at risk) Or, ask your provider about stool tests like a FIT test every year or Cologuard test every 3 years.  To learn more about your testing options, visit: www.[employer]/975029.pdf.  For help making a decision, visit: aurelio/kt49410.  Prostate cancer screening test: If you have a prostate and are age 55  to 69, ask your provider if you would benefit from a yearly prostate cancer screening test.  Lung cancer screening: If you are a current or former smoker age 50 to 80, ask your care team if ongoing lung cancer screenings are right for you.  For informational purposes only. Not to replace the advice of your health care provider. Copyright   2023 [employer]. All rights reserved. Clinically reviewed by the [facility] Transitions Program. [employer] [id number] - REV 04/24.

## 2024-05-29 ENCOUNTER — TELEPHONE (OUTPATIENT)
Dept: FAMILY MEDICINE | Facility: CLINIC | Age: 59
End: 2024-05-29
Payer: COMMERCIAL

## 2024-05-29 DIAGNOSIS — E78.5 HYPERLIPIDEMIA LDL GOAL <130: Primary | ICD-10-CM

## 2024-05-29 RX ORDER — ATORVASTATIN CALCIUM 40 MG/1
40 TABLET, FILM COATED ORAL DAILY
Qty: 90 TABLET | Refills: 4 | Status: SHIPPED | OUTPATIENT
Start: 2024-05-29 | End: 2024-05-31

## 2024-05-29 NOTE — TELEPHONE ENCOUNTER
Wife Rae calls back after Carlos got his cholesterol results and had some time to process the information. Wife calls as Carlos is a  and not much time to speak on the phone she states. They are wondering if he can try life style changes rather than the medication at this time? She states he has been having knee pain for the past couple of years and is not as active as he used to be and is contemplating knee replacement with [facility] recommendation. But wife states she will get him more active to see if that will help or is his cholesterol too high for exercise to help that? Thank you!    Fatoumata Carroll RN

## 2024-05-29 NOTE — TELEPHONE ENCOUNTER
Pls see previous telephone encounter below.     Spouse Nabila called back again (C2C on file) stating they are uncertain about starting the lipitor and were upset that it was just sent to the pharmacy without trying any lifestyle/dietary modifications.     Reviewed lab results with spouse and patient risk factors. Spouse states patient does not smoke and they rarely drink. His activity level has been reduced due to his knee pain which is seeing [facility] for this.     Discussed healthy lifestyle, dietary modifications eating low fat/low cholesterol diet recommended Mediterranean diet, increased activity/exercise, adequate water intake and spouse verbalized good understanding.     Spouse wanting patient to  meet with a RD for further education, advised that she contact her healthcare insurance company to see if this is covered and if they are wanting to pursue spouse will reach out to request a referral.     Spouse also wanting standing order to recheck FLP  in 6 months. At this time they are not planning on starting the medication.    Order pended, message routed to provider to advise.     Julie Behrendt RN

## 2024-05-31 NOTE — TELEPHONE ENCOUNTER
"RN spoke patient's wife and she informed RN and went over the message below from Dr. Harry.    Patient is interested in the referral and rechecking lipids in 3 months or so.     RN cued up referral as requested for insurance as described below.    Wife requested the message below be sent via [employer] for her and patient to review.     Pasted Dr. Harry's communication from today into [employer] message from 5/29/24.    Routing to Dr. Harry to please review the adult nutrition referral with \"key phrase\" in comments.      Please call wife with provider response.  ___________________________________________    Patient sent a [employer] message on 5/29/24 as pasted below:    Carlos Spivey   to [facility] (supporting Adri Harry MD)         5/29/24  3:38 PM  I have confirmed with my insurance provider that a dietitian referral is covered as long as we are referred within [facility] network, and the key words were [facility] to promote a healthy diet per the Affordable Care Act.  Thank you.  "

## 2024-05-31 NOTE — TELEPHONE ENCOUNTER
"Ultimately choosing to start medication is entirely his choice.  His risk factor calculation does take into account the fact that he is not a smoker nor a diabetic.  Please see risk factor calculation below.  The goal of cholesterol treatment is to prevent significant arterial plaque development rather than treat plaque once it is more advanced.  To that end we calculate risk taking into account factors below.  Any 10-year risk score greater than 7.5% should consider some intervention whether that be lifestyle, medication, etc. based on potential additional risk factors. (Which is why in previous years I had recommended lifestyle modification to him based on his cholesterol results - from 9/3/21: \"Cholesterol mildly elevated.  Not abnormal enough at this point to warrant medication changes but I would recommend: increasing daily exercise, increasing dietary fiber, eliminating trans fats and reducing saturated fats.\") and a score greater than 10% should consider treating with medication regardless.  His risk score this year was 11.1% based on current risk factors.  We see the best long-term outcomes in terms of heart attack, stroke and peripheral vascular disease risk reduction in patients who are treated early and aggressively.  Future lab order placed for cholesterol.  I would recommend waiting at least 3 months to check to allow for time for cholesterol to change.        The 10-year ASCVD risk score (Sejal BYRD, et al., 2019) is: 11.1%    Values used to calculate the score:      Age: 58 years      Sex: Male      Is Non- : No      Diabetic: No      Tobacco smoker: No      Systolic Blood Pressure: 130 mmHg      Is BP treated: Yes      HDL Cholesterol: 43 mg/dL      Total Cholesterol: 210 mg/dL   "

## 2024-06-03 NOTE — TELEPHONE ENCOUNTER
Called and provided number to call for scheduling Adult Nutrition  Referral [phone].    They will call to schedule.    Yesenia Quinonez RN on 6/3/2024 at 11:30 AM

## 2024-06-17 PROBLEM — Z71.89 ADVANCED DIRECTIVES, COUNSELING/DISCUSSION: Status: RESOLVED | Noted: 2019-09-20 | Resolved: 2024-06-17

## 2024-08-27 ENCOUNTER — HOSPITAL ENCOUNTER (OUTPATIENT)
Dept: NUTRITION | Facility: CLINIC | Age: 59
Discharge: HOME | End: 2024-08-27
Attending: FAMILY MEDICINE | Admitting: FAMILY MEDICINE
Payer: COMMERCIAL

## 2024-08-27 DIAGNOSIS — E78.5 HYPERLIPIDEMIA LDL GOAL <130: ICD-10-CM

## 2024-08-27 PROCEDURE — 97802 MEDICAL NUTRITION INDIV IN: CPT | Performed by: DIETITIAN, REGISTERED

## 2024-08-27 NOTE — PROGRESS NOTES
"[facility]  Medical Nutrition Therapy    Visit Type: Initial Assessment    Juma Spivey, 59 year old referred by Adri Hrary MD  for MNT related to E78.5 (ICD-10-CM) - Hyperlipidemia LDL goal <130     Patient accompanied by wife, Rae.      Nutrition Assessment:  Anthropometrics  Height:   Ht Readings from Last 1 Encounters:   05/15/24 1.816 m (5' 11.5\")         BMI:  31.8   Weight Status:  Obesity Grade I BMI 30-34.9   Weight:   Wt Readings from Last 1 Encounters:   05/15/24 104.8 kg (231 lb)       UBW: 230 lb      IBW:  80 kg (175 lb) IBW %: 132%        Weight History:   Wt Readings from Last 20 Encounters:   05/15/24 104.8 kg (231 lb)   03/31/23 104.8 kg (231 lb)   03/22/23 104.3 kg (230 lb)   11/18/21 110.2 kg (243 lb)   09/03/21 107 kg (235 lb 12.8 oz)   09/10/20 110 kg (242 lb 9.6 oz)   01/16/20 111.6 kg (246 lb)   09/20/19 108.9 kg (240 lb)   07/13/18 108.2 kg (238 lb 9.6 oz)   01/18/18 113.4 kg (250 lb)   01/02/18 117 kg (258 lb)   12/28/17 117.4 kg (258 lb 12.8 oz)   03/22/17 114 kg (251 lb 6.4 oz)   04/29/16 111.1 kg (245 lb)   11/30/15 114.8 kg (253 lb)   11/12/15 114.8 kg (253 lb)   11/20/14 115.1 kg (253 lb 12.8 oz)   10/03/14 112.9 kg (249 lb)   08/22/13 114.3 kg (252 lb)   02/14/13 112.5 kg (248 lb)   -Wt loss of 27 lb over the past 7 years. Stable wt over the past 1 year.       Goal Weight:   -Patient would like lose 10-15 lb (215-220 lb)    Nutrition History    PMH:   Patient Active Problem List   Diagnosis    Lymphedema    Hypertension goal BP (blood pressure) < 140/90    Hyperlipidemia LDL goal <130    Overweight    Microscopic hematuria    Colon polyp        Labs:   Recent Labs   Lab Test 05/15/24  0728 09/03/21  0730   CHOL 210* 187   HDL 43 46   * 126*   TRIG 65 75          Meds:   Current Outpatient Medications   Medication Instructions    lisinopril-hydrochlorothiazide (ZESTORETIC) 10-12.5 MG tablet 1 tablet, Oral, DAILY    naproxen sodium " (ANAPROX) 220 mg, Oral, 2 TIMES DAILY WITH MEALS        Supplements: reviewed      Social/Environmental:   -average sleep per night: not discussed  -level of stress: not discussed      Food Record  Breakfast: 4:15 am: Oatmeal flavored with water (date raisin walnuts), 6 oz of skim milk   Snack: 7:15 am banana   9:00 am: Islam granola bar   Lunch: Holiday salad (eulogio, 3 strips of turkey, 3 strips of ham, cup of ranch dressing with a fork, sometimes a bowl of soup from [employer] or small sandwich (ham and swiss) 270 kcal   Snack: Cuties 3-4 and an apple   Dinner: Baked potato, grilled chicken OR lettuce wraps OR salmon with rice OR ground turkey hotdish, sometimes broccoli   Snack: Kadeem crackers with Nutella   Beverages: 1 can of Pepsi per day, Sparkling Ice, Gatorade Zero, Propel, 56 oz total   -Take-out not very often, used to be once per weekend, now twice per month. Would go [employer] and have a burger and fries or buffalo chicken salad   -Uses butter, avocado oil, olive oil in cooking, vegetable oil sometimes   -No salt use       Nutritional Details:   -Food allergies: none   -Food intolerances: none  -Food sensitivities:   -GI concerns:  none  -Appetite: good  -Pace of eating: fast   -Role of cooking: self and wife  -Role of food shopping: self and wife      Physical Activity:  -Lifting at work, and very active on the weekends     ASSESSED MALNUTRITION STATUS  % Weight Loss:  None noted  % Intake:  No decreased intake noted  Subcutaneous Fat Loss:  None observed  Loss of Muscle Mass:  None observed  Fluid Retention:  None noted    Malnutrition Diagnosis:  Patient does not meet two of the above criteria necessary for diagnosing malnutrition      Nutrition Diagnosis:  Undesirable food choices related to food and nutrition knowledge deficit as evidenced by unbalanced dietary intake, low intake of veggies, high intake of sodium, dx hyperlipidemia, and LDL-C 154 (H).        Nutrition Intervention:  Nutrition  Prescription Summary: MNT for Hyperlipidemia      Nutrition Education (Content):  -Educated pt on meal planning using MyPlate and the importance of consuming a balanced diet including appropriate servings from all food groups  -Educated pt on label reading  -Educated pt on heart healthy nutrition therapy (choosing WGs, fresh fruits & veggies, and lean protein sources)  -Discussed limiting saturated fats and avoiding trans fats; suggested eating more plant-based meals  -Educated pt on mono/poly and omega 3 unsaturated fat sources (suggested switching from butter and solid margarine to Smart Balance dairy free butter spread or using an oil spritzer)  -Discussed eating more whole, unprocessed foods to limit sodium intake and limiting refined CHOs especially sugar, sweets, and sugar-sweetened beverages  -Educated pt on fiber and ways to increase it in the diet; discussed the benefits of soluble fiber and went over foods high in fiber (list provided); suggested trying flax seed or tiana seeds  -Discussed recommended and not recommended foods from all food groups (list provided)  -Encouraged pt to increase daily water intake  -Discussed the importance of PA and recommended making it a goal to get 60 min per day of exercise (start w/20 min/day)     Emailed/mailed information discussed including nutrition handouts to patient.       Nutrition Prescription: Macronutrient and Micronutrient details   Dosing weight: Current wt (105 kg) for energy and fluids, IBW (80 kg) for protein  Energy: 0523-0744 kcals/day ([place] Jeor)    Justification:  (obesity, to promote a 1 lb wt loss per week)   Protein: 80-96 g Pro/day (1-1.2 g pro/Kg)    Justification:  (obesity guidelines )   Fluid: 1120-8712 mL/day   (1 mL/Kcal)     Justification:  (obese)   Fiber:     Men (>50 years): 30 grams per day         Carbohydrate: 45-65% kcal   <25 g added sugar/day       Fat: 20-35% kcal  <7% kcal from saturated fat   Micronutrient: DRI  <2,300 mg  sodium/day            Vitamin and Mineral Supplements: n/a       Patient Engagement:   Assessed learning needs and learning preference: Yes.  Teaching Method(s) used: Explanation    Nutrition Education (Application):  a)  Discussed current eating plans / recommended alternative food choices    b)  Patient verbalizes understanding of diet by asking questions     Anticipate >75% compliance   Stage of Change:  preparation      Nutrition Goals:  1) Follow the Mediterranean Style Diet   2) Limit saturated fat to <2 g per serving and <15 g per day   3) Choose fish and seafood more often as protein sources  4) Increase water intake to 86 oz per day   5) Stay physically active, aiming for 60 min 5-6 days per week of exercise     Nutrition Follow Up / Monitoring:   Weight, PO intake, PA, labs (lipid panel)      Nutrition Recommendations:  Patient to follow-up with RD in 8 weeks.  Patient has RD contact information to call/email if needed.      Start time: 8:30  End time: 9:30    Total Time Duration: 60 min      Leatha Parada MS, RD, LD, CSM  Clinical Dietitian  [phone]

## 2024-10-03 ENCOUNTER — TRANSFERRED RECORDS (OUTPATIENT)
Dept: HEALTH INFORMATION MANAGEMENT | Facility: CLINIC | Age: 59
End: 2024-10-03
Payer: COMMERCIAL

## 2024-11-14 ENCOUNTER — OFFICE VISIT (OUTPATIENT)
Dept: FAMILY MEDICINE | Facility: CLINIC | Age: 59
End: 2024-11-14
Payer: COMMERCIAL

## 2024-11-14 VITALS
SYSTOLIC BLOOD PRESSURE: 132 MMHG | HEART RATE: 55 BPM | TEMPERATURE: 97.8 F | OXYGEN SATURATION: 99 % | BODY MASS INDEX: 30.34 KG/M2 | DIASTOLIC BLOOD PRESSURE: 76 MMHG | WEIGHT: 224 LBS | HEIGHT: 72 IN | RESPIRATION RATE: 16 BRPM

## 2024-11-14 DIAGNOSIS — I10 HYPERTENSION GOAL BP (BLOOD PRESSURE) < 140/90: ICD-10-CM

## 2024-11-14 DIAGNOSIS — M17.12 PRIMARY OSTEOARTHRITIS OF LEFT KNEE: ICD-10-CM

## 2024-11-14 DIAGNOSIS — Z01.818 PREOP EXAMINATION: Primary | ICD-10-CM

## 2024-11-14 DIAGNOSIS — E78.5 HYPERLIPIDEMIA LDL GOAL <130: ICD-10-CM

## 2024-11-14 DIAGNOSIS — I89.0 LYMPHEDEMA: ICD-10-CM

## 2024-11-14 LAB
ANION GAP SERPL CALCULATED.3IONS-SCNC: 6 MMOL/L (ref 7–15)
BASOPHILS # BLD AUTO: 0 10E3/UL (ref 0–0.2)
BASOPHILS NFR BLD AUTO: 1 %
BUN SERPL-MCNC: 15.4 MG/DL (ref 8–23)
CALCIUM SERPL-MCNC: 8.8 MG/DL (ref 8.8–10.4)
CHLORIDE SERPL-SCNC: 106 MMOL/L (ref 98–107)
CREAT SERPL-MCNC: 1.07 MG/DL (ref 0.67–1.17)
EGFRCR SERPLBLD CKD-EPI 2021: 79 ML/MIN/1.73M2
EOSINOPHIL # BLD AUTO: 0.2 10E3/UL (ref 0–0.7)
EOSINOPHIL NFR BLD AUTO: 4 %
ERYTHROCYTE [DISTWIDTH] IN BLOOD BY AUTOMATED COUNT: 12.4 % (ref 10–15)
GLUCOSE SERPL-MCNC: 93 MG/DL (ref 70–99)
HCO3 SERPL-SCNC: 30 MMOL/L (ref 22–29)
HCT VFR BLD AUTO: 44.6 % (ref 40–53)
HGB BLD-MCNC: 14.5 G/DL (ref 13.3–17.7)
IMM GRANULOCYTES # BLD: 0 10E3/UL
IMM GRANULOCYTES NFR BLD: 0 %
LYMPHOCYTES # BLD AUTO: 1.3 10E3/UL (ref 0.8–5.3)
LYMPHOCYTES NFR BLD AUTO: 28 %
MCH RBC QN AUTO: 29.7 PG (ref 26.5–33)
MCHC RBC AUTO-ENTMCNC: 32.5 G/DL (ref 31.5–36.5)
MCV RBC AUTO: 91 FL (ref 78–100)
MONOCYTES # BLD AUTO: 0.3 10E3/UL (ref 0–1.3)
MONOCYTES NFR BLD AUTO: 7 %
NEUTROPHILS # BLD AUTO: 2.8 10E3/UL (ref 1.6–8.3)
NEUTROPHILS NFR BLD AUTO: 61 %
PLATELET # BLD AUTO: 202 10E3/UL (ref 150–450)
POTASSIUM SERPL-SCNC: 5.1 MMOL/L (ref 3.4–5.3)
RBC # BLD AUTO: 4.88 10E6/UL (ref 4.4–5.9)
SODIUM SERPL-SCNC: 142 MMOL/L (ref 135–145)
WBC # BLD AUTO: 4.6 10E3/UL (ref 4–11)

## 2024-11-14 PROCEDURE — 99214 OFFICE O/P EST MOD 30 MIN: CPT | Mod: 25 | Performed by: FAMILY MEDICINE

## 2024-11-14 PROCEDURE — 36415 COLL VENOUS BLD VENIPUNCTURE: CPT | Performed by: FAMILY MEDICINE

## 2024-11-14 PROCEDURE — 90471 IMMUNIZATION ADMIN: CPT | Performed by: FAMILY MEDICINE

## 2024-11-14 PROCEDURE — 90715 TDAP VACCINE 7 YRS/> IM: CPT | Performed by: FAMILY MEDICINE

## 2024-11-14 PROCEDURE — 80048 BASIC METABOLIC PNL TOTAL CA: CPT | Performed by: FAMILY MEDICINE

## 2024-11-14 PROCEDURE — 85025 COMPLETE CBC W/AUTO DIFF WBC: CPT | Performed by: FAMILY MEDICINE

## 2024-11-14 ASSESSMENT — PAIN SCALES - GENERAL: PAINLEVEL_OUTOF10: MODERATE PAIN (4)

## 2024-11-14 NOTE — NURSING NOTE
"Initial /76   Pulse 55   Temp 97.8  F (36.6  C) (Tympanic)   Resp 16   Ht 1.816 m (5' 11.5\")   Wt 101.6 kg (224 lb)   SpO2 99%   BMI 30.81 kg/m   Estimated body mass index is 30.81 kg/m  as calculated from the following:    Height as of this encounter: 1.816 m (5' 11.5\").    Weight as of this encounter: 101.6 kg (224 lb). .    "

## 2024-11-14 NOTE — PATIENT INSTRUCTIONS
"Hold lisinopril/hydrochlorothiazide AM of surgery.      * * *  If you have a [employer] account results will appear in your MyChart.  If you do not have a MyChart account results will be mailed or called to you.    Lab and imaging results are released \"real time\" into My Chart.  This may mean that you see the results before I have a chance to review them. My Chart will alert you again when I review the results and enter comments.  Sometimes with imaging or labs there may be serious or unexpected results. Critical results are paged to me or the after hours on-call provider so that they can be reviewed immediately.  This is not true of non-critical abnormal results. Unfortunately, this means that it's possible you may be alerted of a serious finding before I have a chance to review it.  If you ever receive a result that you are concerned about and I have not already contacted you, please feel free to reach out to me or the care team so that you get the answers you need. You can call the care team at [phone] and say \"Care Team\".    Additionally, it is my goal that you understand the care plan discussed at your visit and that any questions you have are answered.  Please feel free to reach out if you need clarification or explanation of any information addressed at your office visit.      "

## 2024-11-14 NOTE — PROGRESS NOTES
Preoperative Evaluation  [facility]  [address]  [facility] [address]  Phone: [phone]  Primary Provider: Adri Harry MD  Pre-op Performing Provider: Adri Harry MD  Nov 14, 2024 11/13/2024   Surgical Information   What procedure is being done? Left knee replacement    Facility or Hospital where procedure/surgery will be performed: [facility] [place], [id number], [address], [place] [address]    Who is doing the procedure / surgery? Omid Savage MD    Date of surgery / procedure: December 12, 2024    Time of surgery / procedure: 12:30pm    Where do you plan to recover after surgery? at home with family        Patient-reported     Fax number for surgical facility: [phone]    Assessment & Plan     The proposed surgical procedure is considered INTERMEDIATE risk.    Preop examination      Primary osteoarthritis of left knee      Hypertension goal BP (blood pressure) < 140/90  Well controlled. Refilled medication. Check labs.  Hold Zestoretic AM of surgery  - CBC with Platelets & Differential; Future  - Basic metabolic panel; Future    Lymphedema  Lymphedema referral order placed if this flares post-operatively.     Hyperlipidemia LDL goal <130  Well controlled.                 - No identified additional risk factors other than previously addressed    Antiplatelet or Anticoagulation Medication Instructions   - Patient is on no antiplatelet or anticoagulation medications.    Additional Medication Instructions  Take all scheduled medications on the day of surgery EXCEPT for modifications listed below:   - ACE/ARB: DO NOT TAKE on day of surgery (minimum 11 hours for general anesthesia).   - Diuretics: DO NOT TAKE on the day of surgery.    Recommendation  Approval given to proceed with proposed procedure, without further diagnostic evaluation.    Won Gonzalez is a 59 year old, presenting for the  following:  Pre-Op Exam          11/14/2024     9:25 AM   Additional Questions   Roomed by [facility] DC CMA         11/14/2024   Forms   Any forms needing to be completed Yes    No       Multiple values from one day are sorted in reverse-chronological order     HPI related to upcoming procedure: progressive pain from left knee djd failed conservative management.        11/13/2024   Pre-Op Questionnaire   Have you ever had a heart attack or stroke? No    Have you ever had surgery on your heart or blood vessels, such as a stent placement, a coronary artery bypass, or surgery on an artery in your head, neck, heart, or legs? No    Do you have chest pain with activity? No    Do you have a history of heart failure? No    Do you currently have a cold, bronchitis or symptoms of other infection? No    Do you have a cough, shortness of breath, or wheezing? No    Do you or anyone in your family have previous history of blood clots? No    Do you or does anyone in your family have a serious bleeding problem such as prolonged bleeding following surgeries or cuts? No    Have you ever had problems with anemia or been told to take iron pills? No    Have you had any abnormal blood loss such as black, tarry or bloody stools? No    Have you ever had a blood transfusion? No    Are you willing to have a blood transfusion if it is medically needed before, during, or after your surgery? Yes    Have you or any of your relatives ever had problems with anesthesia? No    Do you have sleep apnea, excessive snoring or daytime drowsiness? No    Do you have any artifical heart valves or other implanted medical devices like a pacemaker, defibrillator, or continuous glucose monitor? No    Do you have artificial joints? No    Are you allergic to latex? No        Patient-reported     Health Care Directive  Patient does not have a Health Care Directive:     Preoperative Review of    reviewed - no record of controlled substances  prescribed.      Status of Chronic Conditions:  See problem list for active medical problems.  Problems all longstanding and stable, except as noted/documented.  See ROS for pertinent symptoms related to these conditions.    HYPERLIPIDEMIA - Patient has a long history of significant Hyperlipidemia requiring medication for treatment with recent good control. Patient reports no problems or side effects with the medication.     HYPERTENSION - Patient has longstanding history of HTN , currently denies any symptoms referable to elevated blood pressure. Specifically denies chest pain, palpitations, dyspnea, orthopnea, PND or peripheral edema. Blood pressure readings have been in normal range. Current medication regimen is as listed below. Patient denies any side effects of medication.     Patient Active Problem List    Diagnosis Date Noted    Colon polyp 12/03/2015     Priority: Medium     A. Cecal polyp:  - Benign colonic mucosa with benign mucosal lymphoid aggregate.    B. Rectal polyp:  - Hyperplastic polyp. Recommendation is to do the next colonoscopy in 10 years. JH      Overweight 09/27/2011     Priority: Medium     Limit cals to 1500 per day (results in 1 lb weight loss per week)  stay on the exercise.  Problem list name updated by automated process. Provider to review      Hyperlipidemia LDL goal <130 10/31/2010     Priority: Medium    Hypertension goal BP (blood pressure) < 140/90 09/18/2008     Priority: Medium    Lymphedema 05/02/2005     Priority: Medium     May 2, 2005: Chronic lymphedema with history of severe lower extremity cellulitis requiring hospitalization on multiple occasions.        Past Medical History:   Diagnosis Date    Other lymphedema      History reviewed. No pertinent surgical history.  Current Outpatient Medications   Medication Sig Dispense Refill    lisinopril-hydrochlorothiazide (ZESTORETIC) 10-12.5 MG tablet Take 1 tablet by mouth daily 90 tablet 4       Allergies   Allergen Reactions  "   Penicillins      Thinks he may be allergic because his father was        Social History     Tobacco Use    Smoking status: Never    Smokeless tobacco: Never   Substance Use Topics    Alcohol use: Yes     Comment: occ.     Family History   Problem Relation Age of Onset    Hypertension Mother     Neurologic Disorder Mother         brain bleeds    Alcohol/Drug Father     Breast Cancer Sister     Skin Cancer Sister      History   Drug Use No             Review of Systems  Constitutional, neuro, ENT, endocrine, pulmonary, cardiac, gastrointestinal, genitourinary, musculoskeletal, integument and psychiatric systems are negative, except as otherwise noted.    Objective    /76   Pulse 55   Temp 97.8  F (36.6  C) (Tympanic)   Resp 16   Ht 1.816 m (5' 11.5\")   Wt 101.6 kg (224 lb)   SpO2 99%   BMI 30.81 kg/m     Estimated body mass index is 30.81 kg/m  as calculated from the following:    Height as of this encounter: 1.816 m (5' 11.5\").    Weight as of this encounter: 101.6 kg (224 lb).  Physical Exam  GENERAL: alert and no distress  EYES: Eyes grossly normal to inspection, PERRL and conjunctivae and sclerae normal  HENT: normal cephalic/atraumatic and ear canals and TM's normal  NECK: no adenopathy, no asymmetry, masses, or scars  RESP: lungs clear to auscultation - no rales, rhonchi or wheezes  CV: regular rate and rhythm, normal S1 S2, no S3 or S4, no murmur, click or rub, no peripheral edema  ABDOMEN: soft, nontender, no hepatosplenomegaly, no masses and bowel sounds normal  MS: no gross musculoskeletal defects noted, no edema  SKIN: no suspicious lesions or rashes  NEURO: Normal strength and tone, mentation intact and speech normal  PSYCH: mentation appears normal, affect normal/bright    Recent Labs   Lab Test 05/15/24  0728      POTASSIUM 4.6   CR 1.05        Diagnostics  Labs pending at this time.  Results will be reviewed when available.   No EKG required, no history of coronary heart disease, " significant arrhythmia, peripheral arterial disease or other structural heart disease.    Revised Cardiac Risk Index (RCRI)  The patient has the following serious cardiovascular risks for perioperative complications:   - No serious cardiac risks = 0 points     RCRI Interpretation: 0 points: Class I (very low risk - 0.4% complication rate)         Signed Electronically by: Adri Harry MD  A copy of this evaluation report is provided to the requesting physician.

## 2024-12-12 ENCOUNTER — TRANSFERRED RECORDS (OUTPATIENT)
Dept: HEALTH INFORMATION MANAGEMENT | Facility: CLINIC | Age: 59
End: 2024-12-12
Payer: COMMERCIAL

## 2025-01-17 ENCOUNTER — TRANSFERRED RECORDS (OUTPATIENT)
Dept: HEALTH INFORMATION MANAGEMENT | Facility: CLINIC | Age: 60
End: 2025-01-17
Payer: COMMERCIAL

## 2025-02-28 ENCOUNTER — TRANSFERRED RECORDS (OUTPATIENT)
Dept: HEALTH INFORMATION MANAGEMENT | Facility: CLINIC | Age: 60
End: 2025-02-28
Payer: COMMERCIAL

## 2025-08-02 DIAGNOSIS — I10 HYPERTENSION GOAL BP (BLOOD PRESSURE) < 140/90: ICD-10-CM

## 2025-08-04 RX ORDER — LISINOPRIL AND HYDROCHLOROTHIAZIDE 10; 12.5 MG/1; MG/1
1 TABLET ORAL DAILY
Qty: 90 TABLET | Refills: 0 | Status: SHIPPED | OUTPATIENT
Start: 2025-08-04

## 2025-08-11 SDOH — HEALTH STABILITY: PHYSICAL HEALTH: ON AVERAGE, HOW MANY MINUTES DO YOU ENGAGE IN EXERCISE AT THIS LEVEL?: 40 MIN

## 2025-08-11 SDOH — HEALTH STABILITY: PHYSICAL HEALTH: ON AVERAGE, HOW MANY DAYS PER WEEK DO YOU ENGAGE IN MODERATE TO STRENUOUS EXERCISE (LIKE A BRISK WALK)?: 3 DAYS

## 2025-08-11 ASSESSMENT — SOCIAL DETERMINANTS OF HEALTH (SDOH): HOW OFTEN DO YOU GET TOGETHER WITH FRIENDS OR RELATIVES?: THREE TIMES A WEEK

## 2025-08-13 ENCOUNTER — OFFICE VISIT (OUTPATIENT)
Dept: FAMILY MEDICINE | Facility: CLINIC | Age: 60
End: 2025-08-13
Payer: COMMERCIAL

## 2025-08-13 ENCOUNTER — ORDERS ONLY (AUTO-RELEASED) (OUTPATIENT)
Dept: FAMILY MEDICINE | Facility: CLINIC | Age: 60
End: 2025-08-13

## 2025-08-13 VITALS
DIASTOLIC BLOOD PRESSURE: 70 MMHG | RESPIRATION RATE: 16 BRPM | OXYGEN SATURATION: 96 % | HEART RATE: 66 BPM | HEIGHT: 72 IN | BODY MASS INDEX: 31.29 KG/M2 | WEIGHT: 231 LBS | SYSTOLIC BLOOD PRESSURE: 114 MMHG | TEMPERATURE: 97 F

## 2025-08-13 DIAGNOSIS — Z13.6 SCREENING FOR CARDIOVASCULAR CONDITION: ICD-10-CM

## 2025-08-13 DIAGNOSIS — Z00.00 ROUTINE GENERAL MEDICAL EXAMINATION AT A HEALTH CARE FACILITY: Primary | ICD-10-CM

## 2025-08-13 DIAGNOSIS — E78.5 HYPERLIPIDEMIA LDL GOAL <130: ICD-10-CM

## 2025-08-13 DIAGNOSIS — Z12.11 COLON CANCER SCREENING: ICD-10-CM

## 2025-08-13 DIAGNOSIS — I89.0 LYMPHEDEMA: ICD-10-CM

## 2025-08-13 DIAGNOSIS — Z12.5 SCREENING FOR PROSTATE CANCER: ICD-10-CM

## 2025-08-13 DIAGNOSIS — I10 HYPERTENSION GOAL BP (BLOOD PRESSURE) < 140/90: ICD-10-CM

## 2025-08-13 LAB
ANION GAP SERPL CALCULATED.3IONS-SCNC: 10 MMOL/L (ref 7–15)
BUN SERPL-MCNC: 15.4 MG/DL (ref 8–23)
CALCIUM SERPL-MCNC: 9.2 MG/DL (ref 8.8–10.4)
CHLORIDE SERPL-SCNC: 102 MMOL/L (ref 98–107)
CHOLEST SERPL-MCNC: 202 MG/DL
CREAT SERPL-MCNC: 1.06 MG/DL (ref 0.67–1.17)
EGFRCR SERPLBLD CKD-EPI 2021: 80 ML/MIN/1.73M2
FASTING STATUS PATIENT QL REPORTED: YES
FASTING STATUS PATIENT QL REPORTED: YES
GLUCOSE SERPL-MCNC: 92 MG/DL (ref 70–99)
HCO3 SERPL-SCNC: 26 MMOL/L (ref 22–29)
HDLC SERPL-MCNC: 40 MG/DL
LDLC SERPL CALC-MCNC: 149 MG/DL
NONHDLC SERPL-MCNC: 162 MG/DL
POTASSIUM SERPL-SCNC: 4.2 MMOL/L (ref 3.4–5.3)
PSA SERPL DL<=0.01 NG/ML-MCNC: 2.04 NG/ML (ref 0–4.5)
SODIUM SERPL-SCNC: 138 MMOL/L (ref 135–145)
TRIGL SERPL-MCNC: 66 MG/DL

## 2025-08-13 PROCEDURE — G0103 PSA SCREENING: HCPCS | Performed by: FAMILY MEDICINE

## 2025-08-13 PROCEDURE — 80061 LIPID PANEL: CPT | Performed by: FAMILY MEDICINE

## 2025-08-13 PROCEDURE — 36415 COLL VENOUS BLD VENIPUNCTURE: CPT | Performed by: FAMILY MEDICINE

## 2025-08-13 PROCEDURE — 80048 BASIC METABOLIC PNL TOTAL CA: CPT | Performed by: FAMILY MEDICINE

## 2025-08-13 RX ORDER — LISINOPRIL AND HYDROCHLOROTHIAZIDE 10; 12.5 MG/1; MG/1
1 TABLET ORAL DAILY
Qty: 90 TABLET | Refills: 4 | Status: SHIPPED | OUTPATIENT
Start: 2025-08-13

## 2025-08-13 ASSESSMENT — PAIN SCALES - GENERAL: PAINLEVEL_OUTOF10: NO PAIN (0)
